# Patient Record
Sex: MALE | Race: WHITE | NOT HISPANIC OR LATINO | Employment: OTHER | URBAN - NONMETROPOLITAN AREA
[De-identification: names, ages, dates, MRNs, and addresses within clinical notes are randomized per-mention and may not be internally consistent; named-entity substitution may affect disease eponyms.]

---

## 2022-08-16 ENCOUNTER — OFFICE VISIT (OUTPATIENT)
Dept: CARDIOLOGY | Facility: CLINIC | Age: 87
End: 2022-08-16

## 2022-08-16 VITALS
HEIGHT: 71 IN | BODY MASS INDEX: 30.8 KG/M2 | HEART RATE: 66 BPM | WEIGHT: 220 LBS | SYSTOLIC BLOOD PRESSURE: 150 MMHG | DIASTOLIC BLOOD PRESSURE: 70 MMHG

## 2022-08-16 DIAGNOSIS — I25.9 IHD (ISCHEMIC HEART DISEASE): Primary | ICD-10-CM

## 2022-08-16 DIAGNOSIS — E88.81 METABOLIC SYNDROME: ICD-10-CM

## 2022-08-16 DIAGNOSIS — R06.02 SHORTNESS OF BREATH: ICD-10-CM

## 2022-08-16 DIAGNOSIS — R07.89 CHEST PRESSURE: ICD-10-CM

## 2022-08-16 DIAGNOSIS — I48.20 CHRONIC ATRIAL FIBRILLATION: ICD-10-CM

## 2022-08-16 DIAGNOSIS — T14.8XXA BRUISE: ICD-10-CM

## 2022-08-16 PROCEDURE — 99204 OFFICE O/P NEW MOD 45 MIN: CPT | Performed by: INTERNAL MEDICINE

## 2022-08-16 PROCEDURE — 93000 ELECTROCARDIOGRAM COMPLETE: CPT | Performed by: INTERNAL MEDICINE

## 2022-08-16 RX ORDER — OMEPRAZOLE 40 MG/1
40 CAPSULE, DELAYED RELEASE ORAL DAILY
COMMUNITY
End: 2022-11-15 | Stop reason: ALTCHOICE

## 2022-08-16 RX ORDER — ISOSORBIDE MONONITRATE 30 MG/1
30 TABLET, EXTENDED RELEASE ORAL 2 TIMES DAILY
COMMUNITY

## 2022-08-16 RX ORDER — SIMVASTATIN 20 MG
20 TABLET ORAL NIGHTLY
COMMUNITY

## 2022-08-16 RX ORDER — ALPRAZOLAM 0.5 MG/1
0.5 TABLET ORAL 2 TIMES DAILY PRN
COMMUNITY

## 2022-08-16 RX ORDER — METOPROLOL SUCCINATE 25 MG/1
TABLET, EXTENDED RELEASE ORAL
COMMUNITY
End: 2023-02-22 | Stop reason: SDUPTHER

## 2022-08-16 RX ORDER — SUCRALFATE 1 G/1
1 TABLET ORAL 2 TIMES DAILY
COMMUNITY
End: 2022-11-15 | Stop reason: ALTCHOICE

## 2022-08-16 RX ORDER — CLOPIDOGREL BISULFATE 75 MG/1
75 TABLET ORAL DAILY
COMMUNITY

## 2022-08-16 RX ORDER — ASPIRIN 325 MG
325 TABLET, DELAYED RELEASE (ENTERIC COATED) ORAL DAILY
COMMUNITY
End: 2022-08-16

## 2022-08-16 NOTE — PROGRESS NOTES
"Chief Complaint   Patient presents with   • Establish Care     PCP referred, chest pain with history of MI   • Chest Pain     Started a couple weeks ago. Discomfort  in chest \" like I needed to burp but couldn't\" Nausea noted. Went to ER at [facility] a week ago Sunday.  Had several episodes over 8 days, after taking prilosec for a week and PCP increased his Imdur the day after he went to ER last Sunday,  symptoms have resolved.    • Cardiac history     Reports having an MI with stenting at [place] 20+ years ago. MI 5 months after the first MI, said cath was normal. Followed with [facility] with regular stress and echo. Last seen about 10 years ago. No testing since and has had no problems until recently.   • Shortness of Breath     With increased exertion.    • Labs     Most recent labs from PCP and ER visit in chart.         CARDIAC COMPLAINTS  chest pressure/discomfort and dyspnea      Subjective   Balbir Gillespie is a 87 y.o. male came in today for his initial cardiac evaluation.  He has history of ischemic heart disease diagnosed more than 20 years ago.  He apparently had 2 MI about 5 months apart and after the second MI he had stent placed.  He has been followed by a cardiologist from [place].  The last time he has seen them was almost 10 years ago.  He has been completely asymptomatic till recently.  But few weeks ago he started having discomfort in his chest in the form of pressure-like feeling where he feels like he could burp and feel better.  But he was not able to burping.  He also started noticing nausea.  About a week ago he was taken to the emergency room because of the symptoms.  His EKG done in the ER which I reviewed showed that he was in atrial fibrillation with occasional PVC.  His lab work showed normal renal function.  Borderline elevated troponin.  He was sent home at that time.  He then had a CT scan of the abdomen which shows evidence of diverticulosis.  Later when he was seen at your " office Prilosec was started after which the symptoms did get better.  He is now scheduled to undergo upper GI.  He is now referred for further evaluation of his cardiac status.  He also complain of having shortness of breath which occurs mostly on exertion.  It occurs mostly when he walk uphill.  He has been bowling regularly and wants to know whether he can go back bowling since he is not having any symptoms at this time.  He denies having any dizziness, palpitation or loss of consciousness.  His cholesterol checked recently was 110 with the LDL of 58.  His liver enzymes were normal.  He used to smoke in the past but quit in .  His mother  of a heart failure and so did his father.    Past Surgical History:   Procedure Laterality Date   • CARDIOVASCULAR STRESS TEST  2011    @ [email or web address]. Lexiscan- EF 44%. Anteroapical Infarct       Current Outpatient Medications   Medication Sig Dispense Refill   • ALPRAZolam (XANAX) 0.5 MG tablet Take 0.5 mg by mouth 2 (Two) Times a Day As Needed for Anxiety.     • clopidogrel (PLAVIX) 75 MG tablet Take 75 mg by mouth Daily.     • isosorbide mononitrate (IMDUR) 30 MG 24 hr tablet Take 30 mg by mouth 2 (Two) Times a Day.     • metoprolol succinate XL (TOPROL-XL) 25 MG 24 hr tablet 1/2 tab once daily     • omeprazole (priLOSEC) 40 MG capsule Take 40 mg by mouth Daily.     • simvastatin (ZOCOR) 20 MG tablet Take 20 mg by mouth Every Night.     • sucralfate (CARAFATE) 1 g tablet Take 1 g by mouth 2 (Two) Times a Day.     • rivaroxaban (XARELTO) 15 MG tablet Take 1 tablet by mouth Daily. 30 tablet 6     No current facility-administered medications for this visit.           ALLERGIES:  Lisinopril    Past Medical History:   Diagnosis Date   • Anxiety    • Coronary artery disease    • History of abdominal surgery     to remove a benign tumor   • History of cholecystectomy    • History of hernia repair    • History of tonsillectomy    • Hyperlipidemia    • Hypertension    •  "Myocardial infarction (HCC)        Social History     Tobacco Use   Smoking Status Former Smoker   • Packs/day: 1.50   • Years: 20.00   • Pack years: 30.00   • Quit date:    • Years since quittin.6   Smokeless Tobacco Never Used          Family History   Problem Relation Age of Onset   • Other Mother         PPM   • Heart failure Mother    • Heart failure Father    • Heart attack Father    • Parkinsonism Brother    • Cancer Maternal Grandmother    • Alcohol abuse Maternal Grandfather    • Other Paternal Grandmother          from natural causes   • Other Paternal Grandfather    • No Known Problems Son    • No Known Problems Son        Review of Systems   Constitutional: Negative for decreased appetite and malaise/fatigue.   HENT: Negative for congestion and sore throat.    Eyes: Negative for blurred vision.   Cardiovascular: Positive for chest pain and dyspnea on exertion.   Respiratory: Positive for shortness of breath. Negative for snoring.    Endocrine: Negative for cold intolerance and heat intolerance.   Hematologic/Lymphatic: Negative for adenopathy. Does not bruise/bleed easily.   Skin: Negative for itching, nail changes and skin cancer.   Musculoskeletal: Negative for arthritis and myalgias.   Gastrointestinal: Negative for abdominal pain, dysphagia and heartburn.   Genitourinary: Negative for bladder incontinence and frequency.   Neurological: Negative for dizziness, light-headedness, seizures and vertigo.   Psychiatric/Behavioral: Negative for altered mental status.   Allergic/Immunologic: Negative for environmental allergies and hives.       Diabetes- No  Thyroid- normal    Objective     /70 (BP Location: Left arm)   Pulse 66   Ht 180.3 cm (71\")   Wt 99.8 kg (220 lb)   BMI 30.68 kg/m²     Vitals and nursing note reviewed.   Constitutional:       Appearance: Healthy appearance. Not in distress.   Eyes:      Conjunctiva/sclera: Conjunctivae normal.      Pupils: Pupils are equal, round, " and reactive to light.   HENT:      Head: Normocephalic.   Pulmonary:      Effort: Pulmonary effort is normal.      Breath sounds: Normal breath sounds.   Cardiovascular:      PMI at left midclavicular line. Normal rate. Irregular rhythm.      Murmurs: There is a grade 3/6 high frequency blowing holosystolic murmur at the apex.   Abdominal:      General: Bowel sounds are normal.      Palpations: Abdomen is soft.   Musculoskeletal: Normal range of motion.      Cervical back: Normal range of motion and neck supple. Skin:     General: Skin is warm and dry.   Neurological:      Mental Status: Alert, oriented to person, place, and time and oriented to person, place and time.           ECG 12 Lead    Date/Time: 8/16/2022 11:17 AM  Performed by: Marlyn Mak MD  Authorized by: Marlyn Mak MD   Comparison: compared with previous ECG from 8/7/2022  Similar to previous ECG  Rhythm: atrial fibrillation  Rate: normal  Conduction: left anterior fascicular block  Q waves: V1, V2, V3 and V4    QRS axis: left    Clinical impression: abnormal EKG              @ASSESSMENT/PLAN@  BMI is >= 30 and <35. (Class 1 Obesity). The following options were offered after discussion;: weight loss educational material (shared in after visit summary), exercise counseling/recommendations and nutrition counseling/recommendations     Diagnoses and all orders for this visit:    1. IHD (ischemic heart disease) (Primary)  -     Stress Test With Myocardial Perfusion One Day; Future  -     Adult Transthoracic Echo Complete W/ Cont if Necessary Per Protocol; Future    2. Chest pressure  -     Stress Test With Myocardial Perfusion One Day; Future    3. Chronic atrial fibrillation (HCC)  -     rivaroxaban (XARELTO) 15 MG tablet; Take 1 tablet by mouth Daily.  Dispense: 30 tablet; Refill: 6    4. Metabolic syndrome    5. Shortness of breath  -     Adult Transthoracic Echo Complete W/ Cont if Necessary Per Protocol; Future    6. Bruise    At  baseline his heart rate is stable.  His blood pressure is mildly elevated.  His EKG shows atrial fibrillation with controlled ventricular response.  He does have Q waves in the anterior leads.  His clinical examination reveals a BMI of 31.  He does have 3/6 systolic murmur at the mitral area.    Regarding his ischemic heart disease, I reviewed the stress test which was done in 2011.  It showed the left ventricular function was around 44% and evidence of apical infarct noted.  At this time, he needs to undergo repeat stress test to rule out any ischemic changes.  I scheduled as a Lexiscan.    Regarding his chest pressure, it does appears to be GI but given his risk factors need to rule out cardiac.  We will review the stress test    Regarding the atrial fibrillation, it seems to be that he has chronic atrial fibrillation with controlled ventricular response.  Since the rate is well controlled, he is not having any symptoms of palpitation.  I did talk to him about the increased risk of developing stroke.  I like to start him on Xarelto.  He was worried about the bruising he has.  When I reviewed his medication, he is on Plavix but on a full dose of aspirin.  I explained to him that 81 mg aspirin should be enough for him.  Since we are starting the Xarelto he can completely stop the aspirin and stay on Plavix alone.    Regarding metabolic syndrome, talked to him about diet and gave him papers for Mediterranean diet which might help for his GI also    Regarding the shortness of breath, it could be secondary to A. fib or it could be from LV dysfunction.  I like to repeat the echocardiogram to reevaluate his LV function, valvular structures, left atrial size and the PA pressure.  If the left ventricular function appears to be less than 45%, need to consider adding ACE inhibitor's or Cozaar to help with the symptoms.    Regarding the bruising, I think cutting down the aspirin should be able to reduce the amount of  bruising.    Regarding advanced directive, he does not have a living will or power of .  I talked to him about it and gave him booklets regarding that    Based on the results, further recommendations will be made               Electronically signed by Marlyn Mak MD August 16, 2022 11:08 EDT

## 2022-08-29 ENCOUNTER — TELEPHONE (OUTPATIENT)
Dept: CARDIOLOGY | Facility: CLINIC | Age: 87
End: 2022-08-29

## 2022-09-02 NOTE — TELEPHONE ENCOUNTER
"Pt called, after talking with PCP he has decided to hold his blood thinners until his testing is done in about 3 weeks \" to let my body recover\". He said he completely understands the risks of going without the blood thinner and is willing to take that risk.  He said that after his testing he would like to start the Eliquis 2.5 mg BID and ASA 81 mg daily but would like to hold off on sending script until after the testing.   "

## 2022-09-27 ENCOUNTER — HOSPITAL ENCOUNTER (OUTPATIENT)
Dept: CARDIOLOGY | Facility: HOSPITAL | Age: 87
Discharge: HOME | End: 2022-09-27

## 2022-09-27 DIAGNOSIS — I25.9 IHD (ISCHEMIC HEART DISEASE): ICD-10-CM

## 2022-09-27 DIAGNOSIS — R06.02 SHORTNESS OF BREATH: ICD-10-CM

## 2022-09-27 DIAGNOSIS — R07.89 CHEST PRESSURE: ICD-10-CM

## 2022-09-27 LAB
BH CV ECHO MEAS - ACS: 2.26 CM
BH CV ECHO MEAS - AO MAX PG: 3.9 MMHG
BH CV ECHO MEAS - AO MEAN PG: 2.3 MMHG
BH CV ECHO MEAS - AO ROOT DIAM: 3.4 CM
BH CV ECHO MEAS - AO V2 MAX: 99.1 CM/SEC
BH CV ECHO MEAS - AO V2 VTI: 21.7 CM
BH CV ECHO MEAS - EDV(CUBED): 147.2 ML
BH CV ECHO MEAS - EDV(MOD-SP4): 103 ML
BH CV ECHO MEAS - EF(MOD-SP4): 25.8 %
BH CV ECHO MEAS - ESV(CUBED): 105.2 ML
BH CV ECHO MEAS - ESV(MOD-SP4): 76.4 ML
BH CV ECHO MEAS - FS: 10.6 %
BH CV ECHO MEAS - IVS/LVPW: 1.08 CM
BH CV ECHO MEAS - IVSD: 1.57 CM
BH CV ECHO MEAS - LA DIMENSION: 5.2 CM
BH CV ECHO MEAS - LAT PEAK E' VEL: 10 CM/SEC
BH CV ECHO MEAS - LV DIASTOLIC VOL/BSA (35-75): 46.9 CM2
BH CV ECHO MEAS - LV MASS(C)D: 353.9 GRAMS
BH CV ECHO MEAS - LV SYSTOLIC VOL/BSA (12-30): 34.8 CM2
BH CV ECHO MEAS - LVIDD: 5.3 CM
BH CV ECHO MEAS - LVIDS: 4.7 CM
BH CV ECHO MEAS - LVOT AREA: 3.7 CM2
BH CV ECHO MEAS - LVOT DIAM: 2.18 CM
BH CV ECHO MEAS - LVPWD: 1.45 CM
BH CV ECHO MEAS - MED PEAK E' VEL: 5.1 CM/SEC
BH CV ECHO MEAS - MV DEC SLOPE: 780 CM/SEC2
BH CV ECHO MEAS - MV E MAX VEL: 108 CM/SEC
BH CV ECHO MEAS - RAP SYSTOLE: 10 MMHG
BH CV ECHO MEAS - RVDD: 3.5 CM
BH CV ECHO MEAS - RVSP: 67.4 MMHG
BH CV ECHO MEAS - SI(MOD-SP4): 12.1 ML/M2
BH CV ECHO MEAS - SV(MOD-SP4): 26.6 ML
BH CV ECHO MEAS - TR MAX PG: 57.4 MMHG
BH CV ECHO MEAS - TR MAX VEL: 378.7 CM/SEC
BH CV ECHO MEASUREMENTS AVERAGE E/E' RATIO: 14.3
BH CV REST NUCLEAR ISOTOPE DOSE: 10 MCI
BH CV STRESS COMMENTS STAGE 1: NORMAL
BH CV STRESS DOSE REGADENOSON STAGE 1: 0.4
BH CV STRESS DURATION MIN STAGE 1: 0
BH CV STRESS DURATION SEC STAGE 1: 10
BH CV STRESS NUCLEAR ISOTOPE DOSE: 30 MCI
BH CV STRESS PROTOCOL 1: NORMAL
BH CV STRESS RECOVERY BP: NORMAL MMHG
BH CV STRESS RECOVERY HR: 65 BPM
BH CV STRESS STAGE 1: 1
LEFT ATRIUM VOLUME INDEX: 45.9 ML/M2
LV EF NUC BP: 20 %
MAXIMAL PREDICTED HEART RATE: 133 BPM
MAXIMAL PREDICTED HEART RATE: 133 BPM
PERCENT MAX PREDICTED HR: 48.87 %
STRESS BASELINE BP: NORMAL MMHG
STRESS BASELINE HR: 62 BPM
STRESS PERCENT HR: 57 %
STRESS POST PEAK BP: NORMAL MMHG
STRESS POST PEAK HR: 65 BPM
STRESS TARGET HR: 113 BPM
STRESS TARGET HR: 113 BPM

## 2022-09-27 PROCEDURE — 25010000002 REGADENOSON 0.4 MG/5ML SOLUTION: Performed by: INTERNAL MEDICINE

## 2022-09-27 PROCEDURE — 0 TECHNETIUM SESTAMIBI: Performed by: INTERNAL MEDICINE

## 2022-09-27 PROCEDURE — 93018 CV STRESS TEST I&R ONLY: CPT | Performed by: INTERNAL MEDICINE

## 2022-09-27 PROCEDURE — 78452 HT MUSCLE IMAGE SPECT MULT: CPT

## 2022-09-27 PROCEDURE — 78452 HT MUSCLE IMAGE SPECT MULT: CPT | Performed by: INTERNAL MEDICINE

## 2022-09-27 PROCEDURE — 93017 CV STRESS TEST TRACING ONLY: CPT

## 2022-09-27 PROCEDURE — 93306 TTE W/DOPPLER COMPLETE: CPT | Performed by: INTERNAL MEDICINE

## 2022-09-27 PROCEDURE — A9500 TC99M SESTAMIBI: HCPCS | Performed by: INTERNAL MEDICINE

## 2022-09-27 PROCEDURE — 93306 TTE W/DOPPLER COMPLETE: CPT

## 2022-09-27 RX ADMIN — TECHNETIUM TC 99M SESTAMIBI 1 DOSE: 1 INJECTION INTRAVENOUS at 08:18

## 2022-09-27 RX ADMIN — REGADENOSON 0.4 MG: 0.08 INJECTION, SOLUTION INTRAVENOUS at 10:08

## 2022-09-27 RX ADMIN — TECHNETIUM TC 99M SESTAMIBI 1 DOSE: 1 INJECTION INTRAVENOUS at 10:08

## 2022-09-28 NOTE — PROGRESS NOTES
Spoke at length with Carlos, she is  aware of stress and echo results and the following recommendations:    Start Entresto 24/26 twice a day and gradually increase  Patient need LifeVest  Patient need elective cardiac catheterization  Need to consider ICD placement if no improvement after 90 days    She is going to discuss results with patient and call me and let me know whether or not  he agrees to proceed with recommendations. He also had not wanted to start the Eliquis until the results from stress and echo. Advised her that he does need to start the Eliquis 2.5 mg BID and ASA 81 mg daily as we had discussed in telephone encounter from 8/29/22.

## 2022-09-29 DIAGNOSIS — I51.9 SYSTOLIC DYSFUNCTION: ICD-10-CM

## 2022-09-29 DIAGNOSIS — R94.39 ABNORMAL NUCLEAR STRESS TEST: ICD-10-CM

## 2022-09-29 DIAGNOSIS — I25.9 IHD (ISCHEMIC HEART DISEASE): Primary | ICD-10-CM

## 2022-09-29 DIAGNOSIS — I42.0 DCM (DILATED CARDIOMYOPATHY): Primary | ICD-10-CM

## 2022-09-29 RX ORDER — SACUBITRIL AND VALSARTAN 24; 26 MG/1; MG/1
1 TABLET, FILM COATED ORAL 2 TIMES DAILY
Qty: 180 TABLET | Refills: 3 | Status: CANCELLED | OUTPATIENT
Start: 2022-09-29

## 2022-09-29 NOTE — TELEPHONE ENCOUNTER
Spoke at length with Carlos, she is  aware of stress and echo results and the following recommendations:     Start Entresto 24/26 twice a day and gradually increase  Patient need LifeVest  Patient need elective cardiac catheterization  Need to consider ICD placement if no improvement after 90 days     She is going to discuss results with patient and call me and let me know whether or not  he agrees to proceed with recommendations. He also had not wanted to start the Eliquis until the results from stress and echo. Advised her that he does need to start the Eliquis 2.5 mg BID and ASA 81 mg daily as we had discussed in telephone encounter from 8/29/22.     Orders in and sent for signature. Charlette at [facility] aware of impending order for Lifevest.

## 2022-09-29 NOTE — TELEPHONE ENCOUNTER
Carlos called back. [employer] rep came to fit for Life vest. After long discussion patient decided that he would take the risk rather than wear the life vest. States he is well aware of risk. Dr Mak aware. Pt is also going to  samples of the Entresto 24/26 mg BID, due to pt's allergy of Lisinopril Dr Mak said to advise him to not have pt start it until Monday 10/3 prior to the cath on Tuesday. Carlos is aware and is going to advise pt.

## 2022-09-29 NOTE — TELEPHONE ENCOUNTER
"Spoke w/ Carlos.  Made aware of provider recommendations.  Pt agreeable to start medications.  Sent to pharmacy.  Offered samples to try if he wanted them. Verbalizes, \"He will just  from the pharmacy and not get the samples.\"    "

## 2022-09-29 NOTE — TELEPHONE ENCOUNTER
"Allergy came up to Lisinopril with Entresto was added. I spoke to patient. He took lisinopril for 10 years then developed an allergy \" I think my tongue swelled up\". Has been about 10 years since that happened. He is willing to try it if you think it could benefit him.   "

## 2022-10-04 ENCOUNTER — OUTSIDE FACILITY SERVICE (OUTPATIENT)
Dept: CARDIOLOGY | Facility: CLINIC | Age: 87
End: 2022-10-04

## 2022-10-04 PROCEDURE — 93458 L HRT ARTERY/VENTRICLE ANGIO: CPT | Performed by: INTERNAL MEDICINE

## 2022-10-11 ENCOUNTER — TELEPHONE (OUTPATIENT)
Dept: CARDIOLOGY | Facility: CLINIC | Age: 87
End: 2022-10-11

## 2022-10-17 ENCOUNTER — OUTSIDE FACILITY SERVICE (OUTPATIENT)
Dept: CARDIOLOGY | Facility: CLINIC | Age: 87
End: 2022-10-17

## 2022-10-17 PROCEDURE — 99215 OFFICE O/P EST HI 40 MIN: CPT | Performed by: INTERNAL MEDICINE

## 2022-10-18 ENCOUNTER — OUTSIDE FACILITY SERVICE (OUTPATIENT)
Dept: CARDIOLOGY | Facility: CLINIC | Age: 87
End: 2022-10-18

## 2022-10-18 PROCEDURE — 93306 TTE W/DOPPLER COMPLETE: CPT | Performed by: INTERNAL MEDICINE

## 2022-10-18 PROCEDURE — 99214 OFFICE O/P EST MOD 30 MIN: CPT | Performed by: INTERNAL MEDICINE

## 2022-11-15 ENCOUNTER — OFFICE VISIT (OUTPATIENT)
Dept: CARDIOLOGY | Facility: CLINIC | Age: 87
End: 2022-11-15

## 2022-11-15 VITALS
HEIGHT: 71 IN | WEIGHT: 209.4 LBS | BODY MASS INDEX: 29.31 KG/M2 | HEART RATE: 74 BPM | SYSTOLIC BLOOD PRESSURE: 140 MMHG | DIASTOLIC BLOOD PRESSURE: 70 MMHG

## 2022-11-15 DIAGNOSIS — Z79.01 CURRENT USE OF LONG TERM ANTICOAGULATION: ICD-10-CM

## 2022-11-15 DIAGNOSIS — I48.20 CHRONIC ATRIAL FIBRILLATION: ICD-10-CM

## 2022-11-15 DIAGNOSIS — I51.9 SYSTOLIC DYSFUNCTION: ICD-10-CM

## 2022-11-15 DIAGNOSIS — I50.21 ACUTE HFREF (HEART FAILURE WITH REDUCED EJECTION FRACTION): ICD-10-CM

## 2022-11-15 DIAGNOSIS — I25.9 IHD (ISCHEMIC HEART DISEASE): Primary | ICD-10-CM

## 2022-11-15 PROCEDURE — 93000 ELECTROCARDIOGRAM COMPLETE: CPT | Performed by: NURSE PRACTITIONER

## 2022-11-15 PROCEDURE — 99214 OFFICE O/P EST MOD 30 MIN: CPT | Performed by: NURSE PRACTITIONER

## 2022-11-15 RX ORDER — FAMOTIDINE 40 MG/1
40 TABLET, FILM COATED ORAL DAILY
COMMUNITY
End: 2023-02-22

## 2022-11-15 NOTE — PROGRESS NOTES
Chief Complaint   Patient presents with   • Hospital Follow Up Visit     Post Cardiac  Cath . Patient  said he  feels stronger after cath and starting Entresto.  Reports all symptoms have improved .   • LABS     Had labs August 2022  , results on chart. No current labs per PCP   • Med Refill     Needs refills on Entresto and Eliquis  to 90 day supply to [facility]       Subjective       Balbir Gillespie is a 87 y.o. male seen in August 2022 to establish cardiac care.  He has history of IHD.  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  He followed in [place] for a period of time. At consultation he admitted to chest pressure and nausea.  He had gone to emergency department for similar symptoms and EKG revealed atrial fibrillation.  Borderline elevated troponin was noted.  CT scan of abdomen showed evidence of diverticulosis.  Prilosec was added.  Repeat EKG showed persistent atrial fibrillation.  Xarelto was prescribed. On 9/27/2022 Lexiscan stress test showed moderate to large area of anterior and apical infarct.  EF was 20%.  Entresto was prescribed along with LifeVest.  Echocardiogram showed EF of 21-25% and mild to moderate TR with RVSP of 67 mmHg. On 10/4/2022 cardiac catheterization revealed significant disease of LAD.  2.75 x 8 mm resolute stent was placed.  He was continued on Entresto and beta-blockers along with a LifeVest.  If no improvement after 90 days then ICD will be advised. On 10/18/2022 echocardiogram showed EF around 30% with moderate MR and RVSP around 46 mmHg.    Today he returns to the office for a follow-up visit.  He admits to feeling much better and denies recent chest pain, shortness of breath, or edema.  He has resumed bowling most days of the week.  Due to history of bleeding with anticoagulation therapy in the past he agreed to low-dose Eliquis therapy.  He remains on Plavix.  Recent bleeding denied.    Cardiac History:    Past  Surgical History:   Procedure Laterality Date   • CARDIOVASCULAR STRESS TEST  2011    @ [email or web address]. Lexiscan- EF 44%. Anteroapical Infarct   • CARDIOVASCULAR STRESS TEST  2022    Lexiscan-170/86. EF 20%. Large anterior and apical  Infarct   • ECHO - CONVERTED  2022    EF < 25%. Anterior WMA. LA- 5.2. Trace-Mild MR & AI. RVSP- 67 mmHg       Current Outpatient Medications   Medication Sig Dispense Refill   • ALPRAZolam (XANAX) 0.5 MG tablet Take 0.5 mg by mouth 2 (Two) Times a Day As Needed for Anxiety.     • apixaban (ELIQUIS) 2.5 MG tablet tablet Take 1 tablet by mouth 2 (Two) Times a Day. 180 tablet 2   • clopidogrel (PLAVIX) 75 MG tablet Take 75 mg by mouth Daily.     • famotidine (PEPCID) 40 MG tablet Take 40 mg by mouth Daily.     • isosorbide mononitrate (IMDUR) 30 MG 24 hr tablet Take 30 mg by mouth 2 (Two) Times a Day.     • metoprolol succinate XL (TOPROL-XL) 25 MG 24 hr tablet 1/2 tab once daily     • sacubitril-valsartan (ENTRESTO) 24-26 MG tablet Take 1 tablet by mouth 2 (Two) Times a Day. 180 tablet 2   • simvastatin (ZOCOR) 20 MG tablet Take 20 mg by mouth Every Night.       No current facility-administered medications for this visit.       Lisinopril    Past Medical History:   Diagnosis Date   • Anxiety    • Coronary artery disease    • History of abdominal surgery     to remove a benign tumor   • History of cholecystectomy    • History of hernia repair    • History of tonsillectomy    • Hyperlipidemia    • Hypertension    • Myocardial infarction (HCC)        Social History     Socioeconomic History   • Marital status:    Tobacco Use   • Smoking status: Former     Packs/day: 1.50     Years: 20.00     Pack years: 30.00     Types: Cigarettes     Quit date:      Years since quittin.9   • Smokeless tobacco: Never   Vaping Use   • Vaping Use: Never used   Substance and Sexual Activity   • Alcohol use: Never   • Drug use: Never       Family History   Problem Relation Age of  "Onset   • Other Mother         PPM   • Heart failure Mother    • Heart failure Father    • Heart attack Father    • Parkinsonism Brother    • Cancer Maternal Grandmother    • Alcohol abuse Maternal Grandfather    • Other Paternal Grandmother          from natural causes   • Other Paternal Grandfather    • No Known Problems Son    • No Known Problems Son        Review of Systems   Constitutional: Positive for malaise/fatigue (improved). Negative for decreased appetite and diaphoresis.   HENT: Negative for nosebleeds.    Eyes: Negative for blurred vision.   Cardiovascular: Positive for irregular heartbeat. Negative for chest pain, claudication, cyanosis, dyspnea on exertion, leg swelling, near-syncope, orthopnea, palpitations, paroxysmal nocturnal dyspnea and syncope.   Respiratory: Negative for shortness of breath.    Endocrine: Negative for cold intolerance and heat intolerance.   Hematologic/Lymphatic: Negative for bleeding problem. Bruises/bleeds easily.   Skin: Negative for rash.   Musculoskeletal: Negative for myalgias.   Gastrointestinal: Negative for heartburn, melena and nausea.   Genitourinary: Negative for dysuria and hematuria.   Neurological: Negative for dizziness and light-headedness.   Psychiatric/Behavioral: The patient does not have insomnia and is not nervous/anxious.         BP Readings from Last 5 Encounters:   11/15/22 140/70   22 150/70       Wt Readings from Last 5 Encounters:   11/15/22 95 kg (209 lb 6.4 oz)   22 99.8 kg (220 lb)       Objective     /70 (BP Location: Left arm, Patient Position: Sitting)   Pulse 74   Ht 180.3 cm (71\")   Wt 95 kg (209 lb 6.4 oz)   BMI 29.21 kg/m²     Vitals and nursing note reviewed.   Constitutional:       Appearance: Healthy appearance. Not in distress.   Eyes:      Conjunctiva/sclera: Conjunctivae normal.      Pupils: Pupils are equal, round, and reactive to light.   HENT:      Head: Normocephalic.   Neck:      Vascular: No carotid " bruit or JVD.   Pulmonary:      Effort: Pulmonary effort is normal.      Breath sounds: Normal breath sounds.   Cardiovascular:      PMI at left midclavicular line. Normal rate. Irregular rhythm.      Murmurs: There is no murmur.   Pulses:     Intact distal pulses.   Edema:     Peripheral edema absent.   Abdominal:      General: Bowel sounds are normal.      Palpations: Abdomen is soft.   Musculoskeletal: Normal range of motion.      Cervical back: Normal range of motion and neck supple. Skin:     General: Skin is warm and dry.   Neurological:      Mental Status: Alert, oriented to person, place, and time and oriented to person, place and time.            ECG 12 Lead    Date/Time: 11/15/2022 4:10 PM  Performed by: Layla Grissom APRN  Authorized by: Layla Grissom APRN   Comparison: compared with previous ECG from 8/16/2022  Similar to previous ECG  Rhythm: atrial fibrillation  BPM: 74                   Assessment & Plan   Diagnoses and all orders for this visit:    1. IHD (ischemic heart disease) (Primary)  -     ECG 12 Lead    2. Systolic dysfunction  -     Adult Transthoracic Echo Limited W/ Cont if Necessary Per Protocol; Future  -     sacubitril-valsartan (ENTRESTO) 24-26 MG tablet; Take 1 tablet by mouth 2 (Two) Times a Day.  Dispense: 180 tablet; Refill: 2    3. Acute HFrEF (heart failure with reduced ejection fraction) (HCC)  -     Adult Transthoracic Echo Limited W/ Cont if Necessary Per Protocol; Future  -     ECG 12 Lead    4. Chronic atrial fibrillation (HCC)  -     apixaban (ELIQUIS) 2.5 MG tablet tablet; Take 1 tablet by mouth 2 (Two) Times a Day.  Dispense: 180 tablet; Refill: 2    5. Current use of long term anticoagulation      IHD  - Recent cardiac catheterization with stenting of LAD.   -Currently anginal symptoms denied.  -He admits to taking Plavix.  Consider adding low-dose daily aspirin  -Continue statin therapy    Systolic heart failure  - Recent cath showed EF around 30%.  We will plan  repeat limited echocardiogram to relook at cardiac output 90 days post stent placement and medication management.    Chronic atrial fibrillation  -EKG shows atrial fibrillation with controlled ventricular rates.  Continue beta-blocker in form of Toprol-XL 25 mg daily.  -For stroke prevention he is on Eliquis therapy but agrees to only renal dose due to history of bleeding in the past.    Further recommendations once repeat echocardiogram results available.  A 3-month follow-up visit scheduled.

## 2022-12-19 ENCOUNTER — TELEPHONE (OUTPATIENT)
Dept: CARDIOLOGY | Facility: CLINIC | Age: 87
End: 2022-12-19

## 2022-12-19 NOTE — TELEPHONE ENCOUNTER
Caller: MERNA CHÁVEZ    Relationship: Emergency Contact    Best call back number:     What is the best time to reach you: ANY    Who are you requesting to speak with (clinical staff, provider,  specific staff member): CLINICAL     Do you know the name of the person who called:     What was the call regarding: PT HAS A SEVERE NOSEBLEED AND IT WONT STOP. CAN PREDNISONE INTERFERE WITH BLOODTHINNERS? HOSPITAL RECENTLY PUT PT ON PREDNISONE. PLEASE CALL BACK TO ANSWER QUESTIONS AND DISCUSS SYMPTOM.     Do you require a callback: YES

## 2022-12-20 NOTE — TELEPHONE ENCOUNTER
"Spoke with pt, he states he is now holding his blood thinner.  I advised the pt that he does not need to hold his blood thinner.  He replied \"he can go to hel, I'm not taking it.\" and hung up the phone.  "

## 2023-01-04 ENCOUNTER — HOSPITAL ENCOUNTER (OUTPATIENT)
Dept: CARDIOLOGY | Facility: HOSPITAL | Age: 88
Discharge: HOME | End: 2023-01-04
Admitting: NURSE PRACTITIONER
Payer: COMMERCIAL

## 2023-01-04 DIAGNOSIS — I50.21 ACUTE HFREF (HEART FAILURE WITH REDUCED EJECTION FRACTION): ICD-10-CM

## 2023-01-04 DIAGNOSIS — I51.9 SYSTOLIC DYSFUNCTION: ICD-10-CM

## 2023-01-04 LAB
BH CV ECHO LEFT VENTRICLE GLOBAL LONGITUDINAL STRAIN: -6.4 %
BH CV ECHO MEAS - ACS: 2.43 CM
BH CV ECHO MEAS - AO ROOT DIAM: 3.3 CM
BH CV ECHO MEAS - EDV(CUBED): 199.7 ML
BH CV ECHO MEAS - EDV(MOD-SP2): 97.4 ML
BH CV ECHO MEAS - EDV(MOD-SP4): 108 ML
BH CV ECHO MEAS - EF(MOD-BP): 31.9 %
BH CV ECHO MEAS - EF(MOD-SP2): 36 %
BH CV ECHO MEAS - EF(MOD-SP4): 20.4 %
BH CV ECHO MEAS - EF_3D-VOL: 48 %
BH CV ECHO MEAS - ESV(CUBED): 138.3 ML
BH CV ECHO MEAS - ESV(MOD-SP2): 62.3 ML
BH CV ECHO MEAS - ESV(MOD-SP4): 86 ML
BH CV ECHO MEAS - FS: 11.5 %
BH CV ECHO MEAS - IVS/LVPW: 0.93 CM
BH CV ECHO MEAS - IVSD: 1.2 CM
BH CV ECHO MEAS - LA DIMENSION: 5.2 CM
BH CV ECHO MEAS - LV DIASTOLIC VOL/BSA (35-75): 50.3 CM2
BH CV ECHO MEAS - LV MASS(C)D: 315.8 GRAMS
BH CV ECHO MEAS - LV SYSTOLIC VOL/BSA (12-30): 40 CM2
BH CV ECHO MEAS - LVIDD: 5.8 CM
BH CV ECHO MEAS - LVIDS: 5.2 CM
BH CV ECHO MEAS - LVPWD: 1.29 CM
BH CV ECHO MEAS - RVDD: 2.9 CM
BH CV ECHO MEAS - SI(MOD-SP2): 16.3 ML/M2
BH CV ECHO MEAS - SI(MOD-SP4): 10.2 ML/M2
BH CV ECHO MEAS - SV(MOD-SP2): 35.1 ML
BH CV ECHO MEAS - SV(MOD-SP4): 22 ML
BH CV ECHO MEAS RV FREE WALL STRAIN: -16.7 %
LEFT ATRIUM VOLUME INDEX: 43.4 ML/M2
MAXIMAL PREDICTED HEART RATE: 133 BPM
STRESS TARGET HR: 113 BPM

## 2023-01-04 PROCEDURE — 93308 TTE F-UP OR LMTD: CPT

## 2023-01-04 PROCEDURE — 93356 MYOCRD STRAIN IMG SPCKL TRCK: CPT

## 2023-01-04 PROCEDURE — 93356 MYOCRD STRAIN IMG SPCKL TRCK: CPT | Performed by: INTERNAL MEDICINE

## 2023-01-04 PROCEDURE — 93308 TTE F-UP OR LMTD: CPT | Performed by: INTERNAL MEDICINE

## 2023-01-05 DIAGNOSIS — I42.0 DCM (DILATED CARDIOMYOPATHY): ICD-10-CM

## 2023-01-05 DIAGNOSIS — I51.9 SYSTOLIC DYSFUNCTION: Primary | ICD-10-CM

## 2023-01-05 DIAGNOSIS — I48.20 CHRONIC ATRIAL FIBRILLATION: ICD-10-CM

## 2023-01-05 DIAGNOSIS — R04.0 BLEEDING FROM THE NOSE: ICD-10-CM

## 2023-01-05 DIAGNOSIS — I50.21 ACUTE HFREF (HEART FAILURE WITH REDUCED EJECTION FRACTION): ICD-10-CM

## 2023-01-05 DIAGNOSIS — Z79.01 CURRENT USE OF LONG TERM ANTICOAGULATION: ICD-10-CM

## 2023-01-09 ENCOUNTER — TELEPHONE (OUTPATIENT)
Dept: CARDIOLOGY | Facility: CLINIC | Age: 88
End: 2023-01-09
Payer: COMMERCIAL

## 2023-01-09 DIAGNOSIS — I51.9 SYSTOLIC DYSFUNCTION: Primary | ICD-10-CM

## 2023-01-09 DIAGNOSIS — I48.20 CHRONIC ATRIAL FIBRILLATION: ICD-10-CM

## 2023-01-09 DIAGNOSIS — Z79.01 CURRENT USE OF LONG TERM ANTICOAGULATION: ICD-10-CM

## 2023-01-09 DIAGNOSIS — R58 BLEEDING: ICD-10-CM

## 2023-01-09 NOTE — TELEPHONE ENCOUNTER
Patient called stating that he is agreeable to see Dr. Barba for ICD and the Watchman. He is asking if his recent stent needs to be looked at to make sure that it isn't causing a problem? He states that he though with stenting that his EF would have been higher.

## 2023-01-11 NOTE — TELEPHONE ENCOUNTER
Patient's caregiver, Carlos, was made aware that at this time, stress test is not recommended at this time.

## 2023-01-12 ENCOUNTER — TELEPHONE (OUTPATIENT)
Dept: CARDIOLOGY | Facility: CLINIC | Age: 88
End: 2023-01-12
Payer: COMMERCIAL

## 2023-01-12 NOTE — TELEPHONE ENCOUNTER
Caller: MERNA CHÁVEZ    Relationship: Emergency Contact    Best call back number: [phone]    What is the best time to reach you: ANYTIME    What was the call regarding: MERNA CALLED ON BEHALF OG THE PT. PT WAS WONDERING IF IT WAS NECESSARY TO BE SEEN BY CARINA WEI ON 2.21.23 WHEN HE IS BEING SEEN BY DR. TRAN 2.22.23. IT WAS EXPLAINED THAT DR. TRAN IS AN EP AND DIFFERENT THAN A GENERAL CARDIOLOGIST. PT THOUGHT IT WOULD BE BETTER TO BE SEEN AFTER THE APPOINTMENT WITH DR. TRAN BUT WANTED DIRECT ADVICE FROM DR. MATSON BEFORE SWITCHING APPOINTMENT.    Do you require a callback: YES

## 2023-01-16 NOTE — TELEPHONE ENCOUNTER
PATIENT CALLED IN  SO WE RESCHEDULED HIS APPT FOR 03/23/2023 @ 3:00 WITH CARINA BLANCAS. HE IS AWARE OF THIS APPT.

## 2023-01-17 DIAGNOSIS — I51.9 SYSTOLIC DYSFUNCTION: ICD-10-CM

## 2023-01-17 NOTE — TELEPHONE ENCOUNTER
Patient called stating that Entresto 24/26 mg BID was going to cost $318 a month. Samples of Entresto 49/51 mg BID were given and patient was aware to take 1/2 tablet BID.

## 2023-02-20 NOTE — PROGRESS NOTES
Electrophysiology Clinic Consult     Balbir Gillespie  1935  [unfilled]  [unfilled]    02/22/23    DATE OF ADMISSION: (Not on file)  [facility] CARDIOLOGY MAIN [facility]    Collin Ace MD  [address]  / [facility] [id number]  Referring Provider: Layla Grissom APRN     Chief Complaint   Patient presents with   • IHD (ischemic heart disease)   • Irregular Heart Beat   • Fatigue     Problem list:   1. ICM, HFrEF  a. History of CAD, LAD stent   b. Echo 9/27/2022 LVEF 21 to 25%, the following LV wall segments are dyskinetic: Apical anterior, apical septal and apex, LV systolic function grade 1 impaired relaxation, LA cavity moderately dilated, RA cavity mildly dilated, trace to mild AR, trace to mild MR, mild to moderate TR  c. Stress test 9/27/22: EF 20%, large area of anterior and apical infarct noted, cardiac cath recommended  d. Lifevest recommended 10/2022, patient declined to wear  e. Left heart cath 10/4/2022: LAD stent patent however critical disease involving mid LAD after the stent, close to 90% stenosis. LAD stent placed. RCA 20-30% disease. Poor LV function, LVEF 25% (Dr. Mak)  f. Echo 10/18/2022: LVEF 30%, four-chamber dilatation, wall motion abnormality, diastolic dysfunction, moderate MR, mild to moderate pulmonary hypertension  g. Echo 1/4/2023: LVEF 26 to 30%, the following LV wall segments are hypokinetic: Mid anterior and basal anterior left atrial cavity moderately dilated 5.2 cm, right atrial cavity mildly dilated  2. Atrial fibrillation  a. Dx: September 2022 incidentally  b. Chadsvasc: 4, on Eliquis, dose decreased due to nosebleed x2 that last 24 hours and required packing  3. Epistaxis x2 in the last 6 months  4. HTN  5. HLD  6. Surgeries:   a. Appendix  b. cardiac stent  c. Gallbladder  d. Hernia  e. tonsillectomy      History of Present Illness:   Balbir Gillespie is an 87-year-old male with above past medical history referred by   Aubree's office for ischemic cardiomyopathy, LV dysfunction possible need for ICD implantation and watchman. He started seeing Dr. Mak last fall due to elevated enzymes found during a GI workup.  He underwent stress test and echo.  Echo 9/2022 showed LVEF around 25% with significant wall motion abnormality involving the anteroseptal wall.  He underwent Lexiscan stress test and large area of infarct noted.  He was started on Entresto and advised to wear LifeVest which he declined. Heart cath showed significant restenoses involving the mid LAD and noncritical disease of RCA, EF of 25%, he underwent stenting of LAD.  He was started on Plavix and Xarelto.  Repeat echo 1/4/2023 showed little improvement in EF, 26 to 30% despite entresto and BB. He has symptoms of fatigue that has worsened in the last 6 months especially w exercise. Denies SOB, He has a chest pain that he says he feels might be muscle related as he bowls regularly. Does not have a history of CP, even when he had a heart attack 20+ years ago.   Caffeine: 1 soda per day  Alcohol: none  Stimulant: none  Nicotine: none    Allergies   Allergen Reactions   • Lisinopril Swelling        Cannot display prior to admission medications because the patient has not been admitted in this contact.            Current Outpatient Medications:   •  ALPRAZolam (XANAX) 0.5 MG tablet, Take 0.5 mg by mouth 2 (Two) Times a Day As Needed for Anxiety., Disp: , Rfl:   •  apixaban (ELIQUIS) 2.5 MG tablet tablet, Take 1 tablet by mouth 2 (Two) Times a Day., Disp: 180 tablet, Rfl: 2  •  clopidogrel (PLAVIX) 75 MG tablet, Take 75 mg by mouth Daily., Disp: , Rfl:   •  isosorbide mononitrate (IMDUR) 30 MG 24 hr tablet, Take 30 mg by mouth 2 (Two) Times a Day., Disp: , Rfl:   •  metoprolol succinate XL (TOPROL-XL) 25 MG 24 hr tablet, 1/2 tab once daily, Disp: , Rfl:   •  pantoprazole (PROTONIX) 40 MG EC tablet, Take 40 mg by mouth Daily., Disp: , Rfl:   •  sacubitril-valsartan  (ENTRESTO) 24-26 MG tablet, Take 1 tablet by mouth 2 (Two) Times a Day., Disp: 28 tablet, Rfl: 0  •  simvastatin (ZOCOR) 20 MG tablet, Take 20 mg by mouth Every Night., Disp: , Rfl:     Social History     Socioeconomic History   • Marital status:    Tobacco Use   • Smoking status: Former     Packs/day: 1.50     Years: 20.00     Pack years: 30.00     Types: Cigarettes     Quit date:      Years since quittin.1   • Smokeless tobacco: Never   Vaping Use   • Vaping Use: Never used   Substance and Sexual Activity   • Alcohol use: Never   • Drug use: Never       Family History   Problem Relation Age of Onset   • Other Mother         PPM   • Heart failure Mother    • Heart failure Father    • Heart attack Father    • Parkinsonism Brother    • Cancer Maternal Grandmother    • Alcohol abuse Maternal Grandfather    • Other Paternal Grandmother          from natural causes   • Other Paternal Grandfather    • No Known Problems Son    • No Known Problems Son        REVIEW OF SYSTEMS:   CONST:  No weight loss, fever, chills, +weakness +fatigue.   HEENT:  No visual loss, blurred vision, double vision, yellow sclerae.                   No hearing loss, congestion, sore throat.   SKIN:      No rashes, urticaria, ulcers, sores.     RESP:     No shortness of breath, hemoptysis, cough, sputum.   GI:           No anorexia, nausea, vomiting, diarrhea. No abdominal pain, melena.   :         No burning on urination, hematuria or increased frequency.  ENDO:    No diaphoresis, cold or heat intolerance. No polyuria or polydipsia.   NEURO:  No headache, dizziness, syncope, paralysis, ataxia, or parasthesias.                  No change in bowel or bladder control. No history of CVA/TIA  MUSC:    No muscle, back pain, joint pain or stiffness.   HEME:    No anemia, bleeding, bruising. No history of DVT/PE.  PSYCH:  No history of depression, anxiety    Vitals:    23 1250   BP: 148/82   BP Location: Right arm   Patient  "Position: Sitting   Pulse: 61   SpO2: 96%   Weight: 97.5 kg (215 lb)   Height: 180.3 cm (71\")                 Physical Exam:  GEN: Well nourished, well-developed, no acute distress  HEENT: Normocephalic, atraumatic, PERRLA, moist mucous membranes  NECK: Supple, NO JVD, no thyromegaly, no lymphadenopathy  CARD: S1S2, IRR, IRR, S3 present no murmur,  rub, PMI displaced laterally.  LUNGS: Clear to auscultation, normal respiratory effort  ABDOMEN: Soft, nontender, normal bowel sounds  EXTREMITIES: No gross deformities, no clubbing, cyanosis, or edema  SKIN: Warm, dry  NEURO: No focal deficits, alert and oriented x 3  PSYCHIATRIC: Normal affect and mood      I personally viewed and interpreted the patient's EKG/Telemetry/lab data    Lab Results   Component Value Date    CALCIUM 9.0 12/06/2021     12/06/2021    K 4.8 12/06/2021    CO2 27 12/06/2021     12/06/2021    BUN 22 12/06/2021    CREATININE 1.13 12/06/2021    EGFRIFAFRI >60 12/06/2021    EGFRIFNONA >60 12/06/2021    BCR 19 12/06/2021    ANIONGAP 11 12/06/2021     Lab Results   Component Value Date    WBC 9.21 12/06/2021    HGB 14.8 12/06/2021    HCT 45.6 12/06/2021    MCV 97 12/06/2021     12/06/2021     No results found for: INR, PROTIME  No results found for: TSH, D4XDSZM, W5UENRV, THYROIDAB            ECG 12 Lead    Date/Time: 2/22/2023 1:55 PM  Performed by: Yariel Barba MD  Authorized by: Yariel Barba MD   Comparison: compared with previous ECG from 11/15/2022  Similar to previous ECG  Rhythm: atrial fibrillation  Rate: normal  BPM: 61                ICD-10-CM ICD-9-CM   1. Ischemic cardiomyopathy  I25.5 414.8   2. AF (paroxysmal atrial fibrillation) ([facility])  I48.0 427.31   3. CAD, multiple vessel  I25.10 414.00   4. Acute on chronic HFrEF (heart failure with reduced ejection fraction) ([facility])  I50.23 428.23       Assessment and Plan:   1. ICM, HFrEF Class II HF symptoms  -Echo 9/27/2022 LVEF 21 to 25%, Lifevest recommended 10/2022, " patient declined to wear  -Most recent Echo 1/4/2023: LVEF 26 to 30%, the following LV wall segments are hypokinetic: Mid anterior and basal anterior left atrial cavity moderately dilated 5.2 cm, right atrial cavity mildly dilated  -Moderate fatigue worsening over the past few months especially with exertional activities.  -GDMT: increase Entresto to 49-51 BID, Toprol XL 25mg daily  -Repeat limited echo in 4 weeks for EF only. If EF doesn't improve (>35%) with increased dose of medications, will discuss need for ICD.     2. Atrial fibrillation   Chadsvasc: 4, on Eliquis, has had 2 nose bleeds that lasted 24 hours and had to go the ER for packing in the past 6 months since starting Eliquis, dose decreased to 2.5mg BID. Nosebleeds may be worse due to combination of plavix and eliquis. He will likely be able to stop plavix 1 year post stent therefore bleeding may decrease once plavix can be stopped.  Unknown at this time if this is permanent versus persistent versus paroxysmal.-will initiate 1 week ziopatch to evaluate amount of afib he is having and heart rates     3.  Oral anticoagulation Robert Vasc Score 4.  Patient is interested in left atrial pended occlusion with a Watchman device due to his recurrent nosebleeds and current need for both Plavix and Eliquis.  We will rediscussed with him regarding this issue following his limited echocardiogram and decision regarding possible ICD in the near future.      4. CAD  -Left heart cath 10/4/2022: LAD stent patent however critical disease involving mid LAD after the stent, close to 90% stenosis. LAD stent placed. RCA 20-30% disease.   -On plavix   -Following with Dr. Mak    [facility]    Scribed for Yariel Barba MD by MAGALIS Arreola. 2/22/2023  14:20 EST     I, Yariel Barba MD, personally performed the services described in this documentation as scribed by the above named individual in my presence, and it is both accurate and complete.  2/22/2023  14:20  EST

## 2023-02-22 ENCOUNTER — OFFICE VISIT (OUTPATIENT)
Dept: CARDIOLOGY | Facility: CLINIC | Age: 88
End: 2023-02-22
Payer: COMMERCIAL

## 2023-02-22 VITALS
HEART RATE: 61 BPM | DIASTOLIC BLOOD PRESSURE: 82 MMHG | WEIGHT: 215 LBS | BODY MASS INDEX: 30.1 KG/M2 | SYSTOLIC BLOOD PRESSURE: 148 MMHG | OXYGEN SATURATION: 96 % | HEIGHT: 71 IN

## 2023-02-22 DIAGNOSIS — I25.10 CAD, MULTIPLE VESSEL: ICD-10-CM

## 2023-02-22 DIAGNOSIS — I51.9 SYSTOLIC DYSFUNCTION: ICD-10-CM

## 2023-02-22 DIAGNOSIS — I25.5 ISCHEMIC CARDIOMYOPATHY: Primary | ICD-10-CM

## 2023-02-22 DIAGNOSIS — I48.0 AF (PAROXYSMAL ATRIAL FIBRILLATION): ICD-10-CM

## 2023-02-22 DIAGNOSIS — I50.23 ACUTE ON CHRONIC HFREF (HEART FAILURE WITH REDUCED EJECTION FRACTION): ICD-10-CM

## 2023-02-22 PROCEDURE — 93000 ELECTROCARDIOGRAM COMPLETE: CPT | Performed by: INTERNAL MEDICINE

## 2023-02-22 PROCEDURE — 99214 OFFICE O/P EST MOD 30 MIN: CPT | Performed by: INTERNAL MEDICINE

## 2023-02-22 RX ORDER — PANTOPRAZOLE SODIUM 40 MG/1
40 TABLET, DELAYED RELEASE ORAL DAILY
COMMUNITY
End: 2023-04-05

## 2023-02-22 RX ORDER — METOPROLOL SUCCINATE 25 MG/1
25 TABLET, EXTENDED RELEASE ORAL DAILY
Qty: 30 TABLET | Refills: 8 | Status: SHIPPED | OUTPATIENT
Start: 2023-02-22

## 2023-03-22 ENCOUNTER — HOSPITAL ENCOUNTER (OUTPATIENT)
Dept: CARDIOLOGY | Facility: HOSPITAL | Age: 88
Discharge: HOME | End: 2023-03-22
Admitting: INTERNAL MEDICINE
Payer: COMMERCIAL

## 2023-03-22 VITALS — HEIGHT: 71 IN | WEIGHT: 214.95 LBS | BODY MASS INDEX: 30.09 KG/M2

## 2023-03-22 DIAGNOSIS — I50.23 ACUTE ON CHRONIC HFREF (HEART FAILURE WITH REDUCED EJECTION FRACTION): ICD-10-CM

## 2023-03-22 DIAGNOSIS — I51.9 SYSTOLIC DYSFUNCTION: ICD-10-CM

## 2023-03-22 LAB
BH CV ECHO MEAS - ACS: 2.29 CM
BH CV ECHO MEAS - AO ROOT DIAM: 3.6 CM
BH CV ECHO MEAS - EDV(CUBED): 215 ML
BH CV ECHO MEAS - EDV(MOD-SP4): 127 ML
BH CV ECHO MEAS - EF(MOD-SP4): 39.5 %
BH CV ECHO MEAS - ESV(CUBED): 99.7 ML
BH CV ECHO MEAS - ESV(MOD-SP4): 76.8 ML
BH CV ECHO MEAS - FS: 22.6 %
BH CV ECHO MEAS - IVS/LVPW: 0.98 CM
BH CV ECHO MEAS - IVSD: 1.09 CM
BH CV ECHO MEAS - LA DIMENSION: 5 CM
BH CV ECHO MEAS - LV DIASTOLIC VOL/BSA (35-75): 58.4 CM2
BH CV ECHO MEAS - LV MASS(C)D: 278.1 GRAMS
BH CV ECHO MEAS - LV SYSTOLIC VOL/BSA (12-30): 35.3 CM2
BH CV ECHO MEAS - LVIDD: 6 CM
BH CV ECHO MEAS - LVIDS: 4.6 CM
BH CV ECHO MEAS - LVPWD: 1.11 CM
BH CV ECHO MEAS - SI(MOD-SP4): 23.1 ML/M2
BH CV ECHO MEAS - SV(MOD-SP4): 50.2 ML
LV EF 2D ECHO EST: 46 %
MAXIMAL PREDICTED HEART RATE: 133 BPM
STRESS TARGET HR: 113 BPM

## 2023-03-22 PROCEDURE — 93308 TTE F-UP OR LMTD: CPT

## 2023-03-22 PROCEDURE — 93308 TTE F-UP OR LMTD: CPT | Performed by: INTERNAL MEDICINE

## 2023-03-23 ENCOUNTER — OFFICE VISIT (OUTPATIENT)
Dept: CARDIOLOGY | Facility: CLINIC | Age: 88
End: 2023-03-23
Payer: COMMERCIAL

## 2023-03-23 VITALS — HEART RATE: 62 BPM | BODY MASS INDEX: 30.02 KG/M2 | HEIGHT: 71 IN | WEIGHT: 214.4 LBS

## 2023-03-23 DIAGNOSIS — Z79.01 CURRENT USE OF LONG TERM ANTICOAGULATION: ICD-10-CM

## 2023-03-23 DIAGNOSIS — I51.9 SYSTOLIC DYSFUNCTION: ICD-10-CM

## 2023-03-23 DIAGNOSIS — I25.9 IHD (ISCHEMIC HEART DISEASE): Primary | ICD-10-CM

## 2023-03-23 DIAGNOSIS — I48.20 CHRONIC ATRIAL FIBRILLATION: ICD-10-CM

## 2023-03-23 PROCEDURE — 99214 OFFICE O/P EST MOD 30 MIN: CPT | Performed by: NURSE PRACTITIONER

## 2023-03-23 PROCEDURE — 93000 ELECTROCARDIOGRAM COMPLETE: CPT | Performed by: NURSE PRACTITIONER

## 2023-03-23 NOTE — PROGRESS NOTES
Chief Complaint   Patient presents with   • Follow-up     Cardiac management. Reports having more energy since starting increased dose of Entresto   • LABS     Had Echo yesterday at [facility]. He has asked about results of testing  Labs- No current labs   • Atrial Fibrillation     Patient is asymptomatic and does not know when/if in AFIB .    • Med Refill     No refills needed today.       Subjective       Balbir Gillespie is a 87 y.o. male  seen in August 2022 to establish cardiac care.  He has history of IHD.  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  He followed in [place] for a period of time. At consultation he admitted to chest pressure and nausea.  He had gone to emergency department for similar symptoms and EKG revealed atrial fibrillation.  Borderline elevated troponin was noted.  CT scan of abdomen showed evidence of diverticulosis.  Prilosec was added.  Repeat EKG showed persistent atrial fibrillation.  Xarelto was prescribed. On 9/27/2022 Lexiscan stress test showed moderate to large area of anterior and apical infarct.  EF was 20%.  Entresto was prescribed along with LifeVest.  Echocardiogram showed EF of 21-25% and mild to moderate TR with RVSP of 67 mmHg. On 10/4/2022 cardiac catheterization revealed significant disease of LAD.  2.75 x 8 mm resolute stent was placed.  He was continued on Entresto and beta-blockers along with a LifeVest.  On 10/18/2022 echocardiogram showed EF around 30% with moderate MR and RVSP around 46 mmHg.  He later developed a significant nosebleed and stopped taking anticoagulation therapy.  He was then referred to EP for further evaluation/recommendations regarding ICD placement and Watchman device.    In February 2023 he seen Dr. Barba for recommendations. Plan was to assess atrial fibrillation burden therefore cardiac monitor placed. The dose of Entresto was increased and plan to repeat echocardiogram to look for improvement of  "LVEF.  Results of Zio patch showed burden of atrial fibrillation at 100%.  NSVT noted. Repeat limited echocardiogram was done 3/22/2023 showing EF 31-35%.    Today he returns to the office for follow-up visit.  Patient denies feeling heart palpitations and only has awareness of irregular heart rate when assesses his pulse.  Since increase in Entresto he has been able to \"walk better\".  No chest pain noted.  He he denies recurrence of nosebleeds and states he is very cautious to not blow his nose.  However, he continues to have a lot of sinus drainage.      Cardiac History:    Past Surgical History:   Procedure Laterality Date   • CARDIOVASCULAR STRESS TEST  02/07/2011    @ [facility]. Lexiscan- EF 44%. Anteroapical Infarct   • CARDIOVASCULAR STRESS TEST  09/27/2022    Lexiscan-170/86. EF 20%. Large anterior and apical  Infarct   • CORONARY ANGIOPLASTY WITH STENT PLACEMENT  10/04/2022    [facility]- 2.75x8 mm Resolute stent mid LAD, EF 25%   • ECHO - CONVERTED  09/27/2022    EF < 25%. Anterior WMA. LA- 5.2. Trace-Mild MR & AI. RVSP- 67 mmHg   • ECHO - CONVERTED  10/08/2022    @ [facility]. EF 30%.Anterior WMA. Mod MR. RVSP- 46 mmHg   • ECHO - CONVERTED  01/04/2023    Dil LV. EF 30%. Anterior WMA. LA- 5.2   • ECHO - CONVERTED  03/22/2023    Limited Echo- EF 30-35%. Anteroseptal WMA. LA- 5.0       Current Outpatient Medications   Medication Sig Dispense Refill   • ALPRAZolam (XANAX) 0.5 MG tablet Take 1 tablet by mouth 2 (Two) Times a Day As Needed for Anxiety.     • apixaban (ELIQUIS) 2.5 MG tablet tablet Take 1 tablet by mouth 2 (Two) Times a Day. 180 tablet 2   • clopidogrel (PLAVIX) 75 MG tablet Take 1 tablet by mouth Daily.     • isosorbide mononitrate (IMDUR) 30 MG 24 hr tablet Take 1 tablet by mouth 2 (Two) Times a Day.     • metoprolol succinate XL (TOPROL-XL) 25 MG 24 hr tablet Take 1 tablet by mouth Daily. 30 tablet 8   • pantoprazole (PROTONIX) 40 MG EC tablet Take 1 tablet by mouth Daily.     • sacubitril-valsartan " (ENTRESTO) 49-51 MG tablet Take 1 tablet by mouth 2 (Two) Times a Day. 60 tablet 5   • simvastatin (ZOCOR) 20 MG tablet Take 1 tablet by mouth Every Night.       No current facility-administered medications for this visit.       Lisinopril    Past Medical History:   Diagnosis Date   • Anxiety    • Coronary artery disease    • History of abdominal surgery     to remove a benign tumor   • History of cholecystectomy    • History of hernia repair    • History of tonsillectomy    • Hyperlipidemia    • Hypertension    • Myocardial infarction (HCC)        Social History     Socioeconomic History   • Marital status:    Tobacco Use   • Smoking status: Former     Packs/day: 1.50     Years: 20.00     Pack years: 30.00     Types: Cigarettes     Quit date:      Years since quittin.2   • Smokeless tobacco: Never   Vaping Use   • Vaping Use: Never used   Substance and Sexual Activity   • Alcohol use: Never   • Drug use: Never       Family History   Problem Relation Age of Onset   • Other Mother         PPM   • Heart failure Mother    • Heart failure Father    • Heart attack Father    • Parkinsonism Brother    • Cancer Maternal Grandmother    • Alcohol abuse Maternal Grandfather    • Other Paternal Grandmother          from natural causes   • Other Paternal Grandfather    • No Known Problems Son    • No Known Problems Son        Review of Systems   Constitutional: Positive for malaise/fatigue. Negative for diaphoresis and fever.   HENT: Positive for nosebleeds (none in last few months).    Cardiovascular: Positive for irregular heartbeat. Negative for chest pain, leg swelling, near-syncope and palpitations.   Respiratory: Positive for shortness of breath (improved).    Hematologic/Lymphatic: Negative for bleeding problem. Bruises/bleeds easily.   Skin: Negative for color change.   Musculoskeletal: Positive for arthritis and joint pain. Negative for falls.   Gastrointestinal: Negative for abdominal pain,  "hematemesis, melena and nausea.   Genitourinary: Negative for dysuria and hematuria.   Neurological: Positive for tremors and weakness (improved).   Psychiatric/Behavioral: Negative for memory loss. The patient is nervous/anxious.         BP Readings from Last 5 Encounters:   02/22/23 148/82   11/15/22 140/70   08/16/22 150/70       Wt Readings from Last 5 Encounters:   03/23/23 97.3 kg (214 lb 6.4 oz)   03/22/23 97.5 kg (214 lb 15.2 oz)   02/22/23 97.5 kg (215 lb)   11/15/22 95 kg (209 lb 6.4 oz)   08/16/22 99.8 kg (220 lb)       Objective     Pulse 62   Ht 180 cm (70.87\")   Wt 97.3 kg (214 lb 6.4 oz)   BMI 30.01 kg/m²     Vitals and nursing note reviewed.   Constitutional:       Appearance: Healthy appearance. Not in distress.   Eyes:      Conjunctiva/sclera: Conjunctivae normal.      Pupils: Pupils are equal, round, and reactive to light.   HENT:      Head: Normocephalic.   Pulmonary:      Effort: Pulmonary effort is normal.      Breath sounds: Normal breath sounds.   Cardiovascular:      PMI at left midclavicular line. Normal rate. Irregular rhythm.   Pulses:     Intact distal pulses.   Edema:     Peripheral edema absent.   Abdominal:      General: Bowel sounds are normal.      Palpations: Abdomen is soft.   Musculoskeletal: Normal range of motion.      Cervical back: Normal range of motion and neck supple. Skin:     General: Skin is warm and dry.   Neurological:      Mental Status: Alert, oriented to person, place, and time and oriented to person, place and time.            ECG 12 Lead    Date/Time: 3/23/2023 4:29 PM  Performed by: Layla Grissom APRN  Authorized by: Layla Grissom APRN   Comparison: compared with previous ECG from 2/22/2023  Similar to previous ECG  Rhythm: atrial fibrillation                 Assessment & Plan   Diagnoses and all orders for this visit:    1. IHD (ischemic heart disease) (Primary)    2. Chronic atrial fibrillation (HCC)    3. Systolic dysfunction    4. Current use of " long term anticoagulation    Other orders  -     ECG 12 Lead      IHD  - 10/4/2022 cath with stent to mid LAD  - Continue Plavix  -Continue statin  - Chest pain denied.  Shortness of breath and effort tolerance improved    Chronic atrial fibrillation  -Recent 7-day cardiac monitor showed 100% atrial fibrillation burden  -EKG today shows atrial fibrillation with controlled rate.  Continue Toprol-XL 25 mg daily.  -Palpitations denied  - Patient denies recurrence of nosebleeds or other signs of bleeding.  Continue Eliquis at renal dose.    Systolic heart failure  -Recent echocardiogram showed EF slightly improved at 31-35%  - Following with EP regarding possible ICD placement  -Currently symptoms well managed  -Continue increased dose Entresto.  Samples given.  -Continue metoprolol succinate    Patient will be seen EP, Dr. Barba, in July for further evaluation and recommendations regarding ICD and Watchman device    A 6-month follow-up visit scheduled.

## 2023-04-05 RX ORDER — PANTOPRAZOLE SODIUM 40 MG/1
TABLET, DELAYED RELEASE ORAL
Qty: 30 TABLET | Refills: 5 | Status: SHIPPED | OUTPATIENT
Start: 2023-04-05

## 2023-05-25 ENCOUNTER — DOCUMENTATION (OUTPATIENT)
Dept: CARDIOLOGY | Facility: CLINIC | Age: 88
End: 2023-05-25
Payer: COMMERCIAL

## 2023-05-25 NOTE — PROGRESS NOTES
Talked with Mr. Gillespie today.  Overall on the higher dose of Entresto he does feel clinically improved.  His repeat echocardiogram demonstrated an increase in his LVEF to 31-35%.  The event recorder did demonstrate permanent atrial fibrillation with adequate rate control and an average heart rate of 65 bpm.  In addition, he has had no recurrent nosebleeds on the lower dose of Eliquis.  Long discussion with him today.  We did discuss the pros and cons of ICD implantation versus waiting an additional period of time to see if his LVEF gets better than 35%.  We also discussed the pros and cons of the Watchman device.  At this point he would like to increase his Entresto with MUGA examination of his LVEF in the last week of June.  Hold off on Watchman device at this time.  We will check BMP 5 days later after increasing his Entresto.

## 2023-05-25 NOTE — Clinical Note
Sharifa, the patient is to increase his Entresto which she is presently on 1 tablet of 47/51 mg p.o. twice daily to taking 2 pills p.o. twice a day.  I would like to have him have a BMP 5 days later (he would like to get it done at [place]) and fax back to me.  In addition I would like to schedule a MUGA examination with Dr. Mak in the last week of June.

## 2023-05-26 ENCOUNTER — TELEPHONE (OUTPATIENT)
Dept: CARDIOLOGY | Facility: CLINIC | Age: 88
End: 2023-05-26
Payer: COMMERCIAL

## 2023-05-26 DIAGNOSIS — I51.9 SYSTOLIC DYSFUNCTION: Primary | ICD-10-CM

## 2023-05-26 DIAGNOSIS — I25.5 ISCHEMIC CARDIOMYOPATHY: ICD-10-CM

## 2023-05-26 DIAGNOSIS — I48.20 CHRONIC ATRIAL FIBRILLATION: ICD-10-CM

## 2023-05-26 DIAGNOSIS — I48.0 AF (PAROXYSMAL ATRIAL FIBRILLATION): ICD-10-CM

## 2023-05-26 NOTE — TELEPHONE ENCOUNTER
----- Message from Yariel Barba MD sent at 5/25/2023  2:21 PM EDT -----  Sharifa, the patient is to increase his Entresto which she is presently on 1 tablet of 47/51 mg p.o. twice daily to taking 2 pills p.o. twice a day.  I would like to have him have a BMP 5 days later (he would like to get it done at [place]) and fax back to me.  In addition I would like to schedule a MUGA examination with Dr. Mak in the last week of June.

## 2023-07-19 ENCOUNTER — TELEPHONE (OUTPATIENT)
Dept: CARDIOLOGY | Facility: CLINIC | Age: 88
End: 2023-07-19

## 2023-07-19 NOTE — TELEPHONE ENCOUNTER
Caller: MERNA CHÁVEZ    Relationship: Emergency Contact    Best call back number: [phone]    What is the best time to reach you: ANYTIME    What was the call regarding: PATIENT SAW DR. TRAN IN [place] TODAY AND HE SAID THAT HE WANTS TO SEE THE PATIENT IN DECEMBER WHEN HE HAS AN APPOINTMENT AT DR. MATSON'S OFFICE. THEY WOULD ALSO LIKE FOR HIM TO HAVE AN ECHOCARDIOGRAM DONE BEFORE.

## 2023-07-21 NOTE — TELEPHONE ENCOUNTER
I spoke with Carlos and she is aware that when he comes in office for appointment   with Layla 10- she  will order Echo prior to his follow up appointment with

## 2023-10-17 ENCOUNTER — OFFICE VISIT (OUTPATIENT)
Dept: CARDIOLOGY | Facility: CLINIC | Age: 88
End: 2023-10-17
Payer: COMMERCIAL

## 2023-10-17 VITALS
DIASTOLIC BLOOD PRESSURE: 80 MMHG | WEIGHT: 211.2 LBS | HEIGHT: 71 IN | HEART RATE: 70 BPM | SYSTOLIC BLOOD PRESSURE: 126 MMHG | BODY MASS INDEX: 29.57 KG/M2

## 2023-10-17 DIAGNOSIS — I25.9 IHD (ISCHEMIC HEART DISEASE): Primary | ICD-10-CM

## 2023-10-17 DIAGNOSIS — Z79.01 CURRENT USE OF LONG TERM ANTICOAGULATION: ICD-10-CM

## 2023-10-17 DIAGNOSIS — I48.20 CHRONIC ATRIAL FIBRILLATION: ICD-10-CM

## 2023-10-17 DIAGNOSIS — I25.5 ISCHEMIC CARDIOMYOPATHY: ICD-10-CM

## 2023-10-17 DIAGNOSIS — Z79.899 MEDICATION MANAGEMENT: ICD-10-CM

## 2023-10-17 DIAGNOSIS — I51.9 SYSTOLIC DYSFUNCTION: ICD-10-CM

## 2023-10-17 DIAGNOSIS — K21.9 GASTROESOPHAGEAL REFLUX DISEASE, UNSPECIFIED WHETHER ESOPHAGITIS PRESENT: ICD-10-CM

## 2023-10-17 PROCEDURE — 93000 ELECTROCARDIOGRAM COMPLETE: CPT | Performed by: NURSE PRACTITIONER

## 2023-10-17 PROCEDURE — 99214 OFFICE O/P EST MOD 30 MIN: CPT | Performed by: NURSE PRACTITIONER

## 2023-10-17 RX ORDER — CHLORDIAZEPOXIDE HYDROCHLORIDE AND CLIDINIUM BROMIDE 5; 2.5 MG/1; MG/1
1 CAPSULE ORAL 2 TIMES DAILY WITH MEALS
COMMUNITY

## 2023-10-17 RX ORDER — ISOSORBIDE MONONITRATE 60 MG/1
TABLET, EXTENDED RELEASE ORAL
Qty: 90 TABLET | Refills: 3 | Status: SHIPPED | OUTPATIENT
Start: 2023-10-17

## 2023-10-17 RX ORDER — PANTOPRAZOLE SODIUM 40 MG/1
40 TABLET, DELAYED RELEASE ORAL DAILY
Qty: 90 TABLET | Refills: 3 | Status: SHIPPED | OUTPATIENT
Start: 2023-10-17

## 2023-10-17 RX ORDER — METOPROLOL SUCCINATE 25 MG/1
25 TABLET, EXTENDED RELEASE ORAL DAILY
Qty: 90 TABLET | Refills: 3 | Status: SHIPPED | OUTPATIENT
Start: 2023-10-17

## 2023-10-17 NOTE — PROGRESS NOTES
Chief Complaint   Patient presents with    Follow-up     Cardiac management    Atrial Fibrillation     Patient says he has no symptoms of AFIB    LABS     Results  May 2023 results on chart    Med Refill     Needs refills on  Eliquis 30 day supply and Metoprolol, Protonix and Imdur 90 day supply to [facility]       Subjective       Balbir Gillespie is a 88 y.o. male seen in August 2022 to establish cardiac care.  He has history of IHD.  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  He followed in [place] for a period of time. At consultation he admitted to chest pressure and nausea.  He had gone to emergency department for similar symptoms and EKG revealed atrial fibrillation.  Borderline elevated troponin was noted.  CT scan of abdomen showed evidence of diverticulosis.  Prilosec was added.  Repeat EKG showed persistent atrial fibrillation.  Xarelto was prescribed. On 9/27/2022 Lexiscan stress test showed moderate to large area of anterior and apical infarct.  EF was 20%.  Entresto was prescribed along with LifeVest.  Echocardiogram showed EF of 21-25% and mild to moderate TR with RVSP of 67 mmHg. On 10/4/2022 cardiac catheterization revealed significant disease of LAD.  2.75 x 8 mm resolute stent was placed.  He was continued on Entresto and beta-blockers along with a LifeVest.  On 10/18/2022 echocardiogram showed EF around 30% with moderate MR and RVSP around 46 mmHg.  He later developed a significant nosebleed and stopped taking anticoagulation therapy.  He was then referred to EP for further evaluation/recommendations regarding ICD placement and Watchman device.     In February 2023 he seen Dr. Barba for recommendations.  To assess atrial fibrillation burden Zio patch placed with results showing 100%, NSVT noted.  Entresto was increased.  Echocardiogram done 3/22/2023 showed EF around 31-35%.  MUGA scan showed 16%.    Patient seen EP in July  2023 with decision to not have ICD. Bleeding was denied, Eliquis continued with plan to stop Plavix 1 year post stenting. Therefore left atrial appendage declined.     Today he returns to the office for a follow up visit.  He has resumed bowling twice a week and maintaining his normal daily activities around home without cardiac concerns.  He denies increased shortness of breath, edema, or significant fatigue.  No chest pain noted.  He remains on Eliquis therapy at renal dose, bleeding denied except for one scant nosebleed that resolved with conservative measures.  He admits to bruising easily.  Blood pressure remained stable.      Cardiac History:    Past Surgical History:   Procedure Laterality Date    CARDIOVASCULAR STRESS TEST  02/07/2011    @ [facility]. Lexiscan- EF 44%. Anteroapical Infarct    CARDIOVASCULAR STRESS TEST  09/27/2022    Lexiscan-170/86. EF 20%. Large anterior and apical  Infarct    CORONARY ANGIOPLASTY WITH STENT PLACEMENT  10/04/2022    [facility]- 2.75x8 mm Resolute stent mid LAD, EF 25%    ECHO - CONVERTED  09/27/2022    EF < 25%. Anterior WMA. LA- 5.2. Trace-Mild MR & AI. RVSP- 67 mmHg    ECHO - CONVERTED  10/08/2022    @ [facility]. EF 30%.Anterior WMA. Mod MR. RVSP- 46 mmHg    ECHO - CONVERTED  01/04/2023    Dil LV. EF 30%. Anterior WMA. LA- 5.2    ECHO - CONVERTED  03/22/2023    Limited Echo- EF 30-35%. Anteroseptal WMA. LA- 5.0    OTHER SURGICAL HISTORY  06/23/2023    MUGA- LVEF 16%. RVSF 55%       Current Outpatient Medications   Medication Sig Dispense Refill    ALPRAZolam (XANAX) 0.5 MG tablet Take 1 tablet by mouth 2 (Two) Times a Day As Needed for Anxiety.      apixaban (ELIQUIS) 2.5 MG tablet tablet Take 1 tablet by mouth 2 (Two) Times a Day. 30 tablet 11    clidinium-chlordiazePOXIDE (LIBRAX) 5-2.5 MG per capsule Take 1 capsule by mouth 2 (Two) Times a Day With Meals.      metoprolol succinate XL (TOPROL-XL) 25 MG 24 hr tablet Take 1 tablet by mouth Daily. 90 tablet 3    pantoprazole (PROTONIX)  40 MG EC tablet Take 1 tablet by mouth Daily. 90 tablet 3    sacubitril-valsartan (ENTRESTO)  MG tablet Take 1 tablet by mouth 2 (Two) Times a Day. 60 tablet 6    simvastatin (ZOCOR) 20 MG tablet Take 1 tablet by mouth Every Night.      isosorbide mononitrate (IMDUR) 60 MG 24 hr tablet Take 1/2 tablet twice a day 90 tablet 3     No current facility-administered medications for this visit.       Lisinopril    Past Medical History:   Diagnosis Date    Anxiety     Coronary artery disease     History of abdominal surgery     to remove a benign tumor    History of cholecystectomy     History of hernia repair     History of tonsillectomy     Hyperlipidemia     Hypertension     Myocardial infarction        Social History     Socioeconomic History    Marital status:    Tobacco Use    Smoking status: Former     Packs/day: 1.50     Years: 20.00     Additional pack years: 0.00     Total pack years: 30.00     Types: Cigarettes     Quit date:      Years since quittin.8    Smokeless tobacco: Never   Vaping Use    Vaping Use: Never used   Substance and Sexual Activity    Alcohol use: Yes     Comment: 2 glass yearly    Drug use: Never       Family History   Problem Relation Age of Onset    Other Mother         PPM    Heart failure Mother     Heart failure Father     Heart attack Father     Parkinsonism Brother     Cancer Maternal Grandmother     Alcohol abuse Maternal Grandfather     Other Paternal Grandmother          from natural causes    Other Paternal Grandfather     No Known Problems Son     No Known Problems Son        Review of Systems   Constitutional: Positive for malaise/fatigue (Relates to age, no worse). Negative for diaphoresis and fever.   HENT:  Positive for congestion (Mild nasal, no worse). Negative for hoarse voice and nosebleeds.    Cardiovascular:  Positive for irregular heartbeat. Negative for chest pain, leg swelling, near-syncope and palpitations.   Respiratory:  Negative for  "shortness of breath and sleep disturbances due to breathing.    Hematologic/Lymphatic: Negative for bleeding problem. Bruises/bleeds easily.   Musculoskeletal:  Positive for back pain. Negative for falls.   Gastrointestinal:  Negative for change in bowel habit, melena and nausea.   Genitourinary:  Negative for hematuria.   Neurological:  Negative for dizziness and loss of balance.        BP Readings from Last 5 Encounters:   10/17/23 126/80   07/19/23 136/78   02/22/23 148/82   11/15/22 140/70   08/16/22 150/70       Wt Readings from Last 5 Encounters:   10/17/23 95.8 kg (211 lb 3.2 oz)   07/19/23 96.6 kg (213 lb)   03/23/23 97.3 kg (214 lb 6.4 oz)   03/22/23 97.5 kg (214 lb 15.2 oz)   02/22/23 97.5 kg (215 lb)       Objective     /80 (BP Location: Left arm, Patient Position: Sitting)   Pulse 70   Ht 180 cm (70.87\")   Wt 95.8 kg (211 lb 3.2 oz)   BMI 29.56 kg/m²     Vitals and nursing note reviewed.   Constitutional:       Appearance: Normal appearance. Well-groomed and not in distress.   Eyes:      Conjunctiva/sclera: Conjunctivae normal.      Pupils: Pupils are equal, round, and reactive to light.   HENT:      Head: Normocephalic.   Pulmonary:      Effort: Pulmonary effort is normal.      Breath sounds: Normal breath sounds.   Cardiovascular:      PMI at left midclavicular line. Normal rate. Irregular rhythm.   Pulses:     Intact distal pulses.   Edema:     Peripheral edema absent.   Abdominal:      General: Bowel sounds are normal.      Palpations: Abdomen is soft.   Musculoskeletal: Normal range of motion.      Cervical back: Normal range of motion and neck supple. Skin:     General: Skin is warm and dry.   Neurological:      Mental Status: Alert, oriented to person, place, and time and oriented to person, place and time.   Psychiatric:         Behavior: Behavior is cooperative.            ECG 12 Lead    Date/Time: 10/19/2023 2:26 PM  Performed by: Layla Grissom APRN    Authorized by: Layla Grissom " R, APRN  Comparison: compared with previous ECG from 3/23/2023  Similar to previous ECG  Rhythm: atrial fibrillation  Rate: normal  BPM: 70               Assessment & Plan   Diagnoses and all orders for this visit:    1. IHD (ischemic heart disease) (Primary)  -     isosorbide mononitrate (IMDUR) 60 MG 24 hr tablet; Take 1/2 tablet twice a day  Dispense: 90 tablet; Refill: 3    2. Ischemic cardiomyopathy  -     Adult Transthoracic Echo Complete W/ Cont if Necessary Per Protocol; Future    3. Systolic dysfunction  -     Adult Transthoracic Echo Complete W/ Cont if Necessary Per Protocol; Future  -     metoprolol succinate XL (TOPROL-XL) 25 MG 24 hr tablet; Take 1 tablet by mouth Daily.  Dispense: 90 tablet; Refill: 3    4. Chronic atrial fibrillation  -     apixaban (ELIQUIS) 2.5 MG tablet tablet; Take 1 tablet by mouth 2 (Two) Times a Day.  Dispense: 30 tablet; Refill: 11    5. Gastroesophageal reflux disease, unspecified whether esophagitis present  -     pantoprazole (PROTONIX) 40 MG EC tablet; Take 1 tablet by mouth Daily.  Dispense: 90 tablet; Refill: 3    Other orders  -     ECG 12 Lead        IHD  -Denies chest pain  - 10/4/2022 cath with stent to mid LAD  - Given 1 year post stenting Plavix will be discontinued.  -Continue statin     Chronic atrial fibrillation  -7-day cardiac monitor showed 100% atrial fibrillation burden  -EKG today shows atrial fibrillation with controlled rate.    -Continue Toprol-XL 25 mg daily.  -Palpitations denied  - Patient denies bleeding.  Continue Eliquis at renal dose.  -Watchman device declined given he is now tolerating anticoagulation therapy     Systolic heart failure  -Recent echocardiogram showed EF 31-35%, MUGA scan LVEF 16%  - Following with EP.  -Currently symptoms well managed, appears euvolemic  -Continue increased dose Entresto.    -Continue metoprolol succinate  -Echocardiogram ordered to be done in December prior to follow-up visit with EP.    6-month follow-up  visit scheduled.

## 2023-10-19 PROCEDURE — 93000 ELECTROCARDIOGRAM COMPLETE: CPT | Performed by: NURSE PRACTITIONER

## 2023-11-07 ENCOUNTER — TELEPHONE (OUTPATIENT)
Dept: CARDIOLOGY | Facility: CLINIC | Age: 88
End: 2023-11-07
Payer: COMMERCIAL

## 2023-11-07 NOTE — TELEPHONE ENCOUNTER
Caller: Balbir Gillespie    Relationship: Self    Best call back number:[phone]    What is the best time to reach you: ANY    Who are you requesting to speak with (clinical staff, provider,  specific staff member): DR TRAN      What was the call regarding: PATIENT CALLED TO INQUIRE ABOUT HIS RESULTS REGARDING HIS 11-20-23 SCHEDULED ECHO. PATIENT WOULD LIKE DR TRAN TO CONTACT HIM TO ADVISE THE RESULTS AND IF HE WOULD LIKE TO SEE HIM AS WELL.     Is it okay if the provider responds through [employer]: PLEASE CALL

## 2023-11-08 ENCOUNTER — TELEPHONE (OUTPATIENT)
Dept: CARDIOLOGY | Facility: CLINIC | Age: 88
End: 2023-11-08
Payer: COMMERCIAL

## 2023-11-08 NOTE — TELEPHONE ENCOUNTER
I called to speak with the patient. The patient did not answer. I left a voicemail for a return call.

## 2023-11-08 NOTE — TELEPHONE ENCOUNTER
I called and spoke with the patient. He states he is having an echocardiogram on 11-20-23. He states he lives in [place] and would prefer not to drive to [place] to receive the result from Dr. Barba. He would like our office to reach out to him to go over the results once we receive them.     I communicated to the patient that we would reach out once results are received. Patient voiced understanding.

## 2023-11-08 NOTE — TELEPHONE ENCOUNTER
Caller: Balbir Gillespie    Relationship: Self    Best call back number: [phone]     What is the best time to reach you: ANY     Who are you requesting to speak with (clinical staff, provider,  specific staff member): CLINICAL    Do you know the name of the person who called: MICHAEL     What was the call regarding: PT WAS RETURNING MICHAEL'S CALL. PLEASE REACH OUT TO FURTHER ADVISE. THANK YOU!

## 2023-11-20 ENCOUNTER — HOSPITAL ENCOUNTER (OUTPATIENT)
Dept: CARDIOLOGY | Facility: HOSPITAL | Age: 88
Discharge: HOME | End: 2023-11-20
Admitting: NURSE PRACTITIONER
Payer: COMMERCIAL

## 2023-11-20 VITALS — BODY MASS INDEX: 29.57 KG/M2 | HEIGHT: 71 IN | WEIGHT: 211.2 LBS

## 2023-11-20 DIAGNOSIS — I25.5 ISCHEMIC CARDIOMYOPATHY: ICD-10-CM

## 2023-11-20 DIAGNOSIS — I51.9 SYSTOLIC DYSFUNCTION: ICD-10-CM

## 2023-11-20 LAB
AORTIC DIMENSIONLESS INDEX: 0.94 (DI)
BH CV ECHO MEAS - AO MAX PG: 2.04 MMHG
BH CV ECHO MEAS - AO MEAN PG: 1.24 MMHG
BH CV ECHO MEAS - AO V2 MAX: 71.5 CM/SEC
BH CV ECHO MEAS - AO V2 VTI: 15.2 CM
BH CV ECHO MEAS - EDV(CUBED): 175.7 ML
BH CV ECHO MEAS - EF(MOD-BP): 36 %
BH CV ECHO MEAS - EF_3D-VOL: 45 %
BH CV ECHO MEAS - ESV(CUBED): 101.2 ML
BH CV ECHO MEAS - FS: 16.8 %
BH CV ECHO MEAS - IVS/LVPW: 0.94 CM
BH CV ECHO MEAS - IVSD: 1.14 CM
BH CV ECHO MEAS - LA DIMENSION: 4.6 CM
BH CV ECHO MEAS - LAT PEAK E' VEL: 6.4 CM/SEC
BH CV ECHO MEAS - LV MASS(C)D: 273.2 GRAMS
BH CV ECHO MEAS - LV MAX PG: 1.82 MMHG
BH CV ECHO MEAS - LV MEAN PG: 0.9 MMHG
BH CV ECHO MEAS - LV V1 MAX: 67.4 CM/SEC
BH CV ECHO MEAS - LV V1 VTI: 14.8 CM
BH CV ECHO MEAS - LVIDD: 5.6 CM
BH CV ECHO MEAS - LVIDS: 4.7 CM
BH CV ECHO MEAS - LVPWD: 1.21 CM
BH CV ECHO MEAS - MED PEAK E' VEL: 4.3 CM/SEC
BH CV ECHO MEAS - MV A MAX VEL: 20.4 CM/SEC
BH CV ECHO MEAS - MV DEC TIME: 0.27 SEC
BH CV ECHO MEAS - MV E MAX VEL: 52.1 CM/SEC
BH CV ECHO MEAS - MV E/A: 2.6
BH CV ECHO MEAS - MV MAX PG: 2.1 MMHG
BH CV ECHO MEAS - MV MEAN PG: 0.66 MMHG
BH CV ECHO MEAS - MV V2 VTI: 27.4 CM
BH CV ECHO MEAS - PA V2 MAX: 72.5 CM/SEC
BH CV ECHO MEAS - RAP SYSTOLE: 8 MMHG
BH CV ECHO MEAS - RV MAX PG: 1.47 MMHG
BH CV ECHO MEAS - RV V1 MAX: 60.7 CM/SEC
BH CV ECHO MEAS - RV V1 VTI: 12.2 CM
BH CV ECHO MEAS - RVDD: 3.6 CM
BH CV ECHO MEAS - RVSP: 42 MMHG
BH CV ECHO MEAS - TR MAX PG: 34 MMHG
BH CV ECHO MEAS - TR MAX VEL: 291.7 CM/SEC
BH CV ECHO MEASUREMENTS AVERAGE E/E' RATIO: 9.74
SINUS: 3.4 CM

## 2023-11-20 PROCEDURE — 93306 TTE W/DOPPLER COMPLETE: CPT

## 2023-11-22 ENCOUNTER — TELEPHONE (OUTPATIENT)
Dept: CARDIOLOGY | Facility: CLINIC | Age: 88
End: 2023-11-22

## 2024-03-05 RX ORDER — SACUBITRIL AND VALSARTAN 97; 103 MG/1; MG/1
1 TABLET, FILM COATED ORAL 2 TIMES DAILY
Qty: 60 TABLET | Refills: 0 | Status: SHIPPED | OUTPATIENT
Start: 2024-03-05

## 2024-03-18 ENCOUNTER — TELEPHONE (OUTPATIENT)
Dept: CARDIOLOGY | Facility: CLINIC | Age: 89
End: 2024-03-18
Payer: COMMERCIAL

## 2024-03-18 NOTE — TELEPHONE ENCOUNTER
Patient left VM, he was evaluated at [facility] ER 3/15/24 for chest pain and advised to call here for follow up.  ER note and EKG printed for review.

## 2024-03-20 NOTE — TELEPHONE ENCOUNTER
I spoke with patient and he feels the episode was possibly due to indigestion.  He reports feeling fine now  and has been bowling since this episodes. He is reminded of his follow up appointment 4- and is aware to call us if needed.

## 2024-04-04 RX ORDER — SACUBITRIL AND VALSARTAN 97; 103 MG/1; MG/1
1 TABLET, FILM COATED ORAL 2 TIMES DAILY
Qty: 60 TABLET | Refills: 0 | Status: SHIPPED | OUTPATIENT
Start: 2024-04-04

## 2024-04-04 NOTE — TELEPHONE ENCOUNTER
Susanne-please contact patient for a f/u appt. He saw Dr. Barba 7/19/2023 and was supposed to f/u in 6 months. I will send in his entresto but he will need an appointment for additional refills. Thanks.

## 2024-04-24 ENCOUNTER — OFFICE VISIT (OUTPATIENT)
Dept: CARDIOLOGY | Facility: CLINIC | Age: 89
End: 2024-04-24
Payer: COMMERCIAL

## 2024-04-24 VITALS
HEIGHT: 71 IN | WEIGHT: 214.8 LBS | SYSTOLIC BLOOD PRESSURE: 126 MMHG | BODY MASS INDEX: 30.07 KG/M2 | HEART RATE: 68 BPM | DIASTOLIC BLOOD PRESSURE: 62 MMHG

## 2024-04-24 DIAGNOSIS — I25.9 IHD (ISCHEMIC HEART DISEASE): Primary | ICD-10-CM

## 2024-04-24 DIAGNOSIS — Z79.01 CURRENT USE OF LONG TERM ANTICOAGULATION: ICD-10-CM

## 2024-04-24 DIAGNOSIS — I25.5 ISCHEMIC CARDIOMYOPATHY: ICD-10-CM

## 2024-04-24 DIAGNOSIS — I48.20 CHRONIC ATRIAL FIBRILLATION: ICD-10-CM

## 2024-04-24 DIAGNOSIS — I51.9 SYSTOLIC DYSFUNCTION: ICD-10-CM

## 2024-04-24 RX ORDER — SACUBITRIL AND VALSARTAN 97; 103 MG/1; MG/1
1 TABLET, FILM COATED ORAL 2 TIMES DAILY
Qty: 60 TABLET | Refills: 8 | Status: SHIPPED | OUTPATIENT
Start: 2024-04-24

## 2024-04-24 RX ORDER — ISOSORBIDE MONONITRATE 30 MG/1
30 TABLET, EXTENDED RELEASE ORAL DAILY
COMMUNITY

## 2024-04-24 NOTE — PROGRESS NOTES
Chief Complaint   Patient presents with    Follow-up     Cardiac management. Patient had ER visit 3-2024 , notes are in chart    Atrial Fibrillation     Patient reports  he had a couple separate episode of chest pressure  with use of Nitro with relief. He adds that both episodes were soon after eating.  See telephone encounter March 2024    LABS     Result March 2024 on chart    Med Refill     Needs refills on entresto 30 day supply to [facility] .       Subjective       Balbir Gillespie is a 89 y.o. male seen in August 2022 to establish cardiac care.  He has history of IHD.  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  He followed in [place] for a period of time. At consultation he admitted to chest pressure and nausea.  He had gone to emergency department for similar symptoms and EKG revealed atrial fibrillation.  Borderline elevated troponin was noted.  CT scan of abdomen showed evidence of diverticulosis.  Prilosec was added.  Repeat EKG showed persistent atrial fibrillation.  Xarelto was prescribed. On 9/27/2022 Lexiscan stress test showed moderate to large area of anterior and apical infarct.  EF was 20%.  Entresto was prescribed along with LifeVest.  Echocardiogram showed EF of 21-25% and mild to moderate TR with RVSP of 67 mmHg. On 10/4/2022 cardiac catheterization revealed significant disease of LAD.  2.75 x 8 mm resolute stent was placed.  He was continued on Entresto and beta-blockers along with a LifeVest.  On 10/18/2022 echocardiogram showed EF around 30% with moderate MR and RVSP around 46 mmHg.  He later developed a significant nosebleed and stopped taking anticoagulation therapy.  He was then referred to EP for further evaluation/recommendations regarding ICD placement and Watchman device.     In February 2023 he seen Dr. Barba for recommendations.  To assess atrial fibrillation burden Zio patch placed with results showing 100%  A-fib burden.  Entresto was increased.  Echocardiogram done 3/22/2023 showed EF around 31-35%.  MUGA scan showed 16%.     Patient seen EP in July 2023 with decision to not have ICD. Bleeding was denied, Eliquis continued with plan to stop Plavix 1 year post stenting. Therefore left atrial appendage declined.     On 11/20/2023 echocardiogram showed mild to moderately dilated left ventricle cavity, mild LVH with a EF around 30%, mild to moderate TR and RVSP 42 mmHg.    Today he returns to the office for follow-up visit.  At last visit Plavix was stopped and Eliquis therapy continue due to atrial fibrillation.  He denies recent nosebleeds or other signs of bleeding.  He continues to bowl several times a week without cardiac concerns.  Blood pressure remained stable.  He is on lower dose Imdur, otherwise no medication changes noted.  Since he does not plan to have left atrial appendage closure device he request to cancel follow-up with EP.    Cardiac History:    Past Surgical History:   Procedure Laterality Date    CARDIOVASCULAR STRESS TEST  02/07/2011    @ [facility]. Lexiscan- EF 44%. Anteroapical Infarct    CARDIOVASCULAR STRESS TEST  09/27/2022    Lexiscan-170/86. EF 20%. Large anterior and apical  Infarct    CORONARY ANGIOPLASTY WITH STENT PLACEMENT  10/04/2022    [facility]- 2.75x8 mm Resolute stent mid LAD, EF 25%    ECHO - CONVERTED  09/27/2022    EF < 25%. Anterior WMA. LA- 5.2. Trace-Mild MR & AI. RVSP- 67 mmHg    ECHO - CONVERTED  10/08/2022    @ [facility]. EF 30%.Anterior WMA. Mod MR. RVSP- 46 mmHg    ECHO - CONVERTED  01/04/2023    Dil LV. EF 30%. Anterior WMA. LA- 5.2    ECHO - CONVERTED  03/22/2023    Limited Echo- EF 30-35%. Anteroseptal WMA. LA- 5.0    ECHO - CONVERTED  11/20/2023    EF 30%.Apical WMA. LA- 4.6. Mild MR. RVSP- 42 mmHg    OTHER SURGICAL HISTORY  06/23/2023    MUGA- LVEF 16%. RVSF 55%       Current Outpatient Medications   Medication Sig Dispense Refill    ALPRAZolam (XANAX) 0.5 MG tablet Take 1 tablet  by mouth 2 (Two) Times a Day As Needed for Anxiety.      apixaban (ELIQUIS) 2.5 MG tablet tablet Take 1 tablet by mouth 2 (Two) Times a Day. 30 tablet 11    clidinium-chlordiazePOXIDE (LIBRAX) 5-2.5 MG per capsule Take 1 capsule by mouth Daily.      isosorbide mononitrate (IMDUR) 30 MG 24 hr tablet Take 1 tablet by mouth Daily. 1/2 tablet twice daily      metoprolol succinate XL (TOPROL-XL) 25 MG 24 hr tablet Take 1 tablet by mouth Daily. 90 tablet 3    pantoprazole (PROTONIX) 40 MG EC tablet Take 1 tablet by mouth Daily. 90 tablet 3    sacubitril-valsartan (Entresto)  MG tablet Take 1 tablet by mouth 2 (Two) Times a Day. 60 tablet 8    simvastatin (ZOCOR) 20 MG tablet Take 1 tablet by mouth Every Night.       No current facility-administered medications for this visit.       Lisinopril    Past Medical History:   Diagnosis Date    Anxiety     Coronary artery disease     History of abdominal surgery     to remove a benign tumor    History of cholecystectomy     History of hernia repair     History of tonsillectomy     Hyperlipidemia     Hypertension     Myocardial infarction        Social History     Socioeconomic History    Marital status:    Tobacco Use    Smoking status: Former     Current packs/day: 0.00     Average packs/day: 1.5 packs/day for 20.0 years (30.0 ttl pk-yrs)     Types: Cigarettes     Start date:      Quit date:      Years since quittin.3    Smokeless tobacco: Never   Vaping Use    Vaping status: Never Used   Substance and Sexual Activity    Alcohol use: Yes     Comment: 2 glass yearly    Drug use: Never       Family History   Problem Relation Age of Onset    Other Mother         PPM    Heart failure Mother     Heart failure Father     Heart attack Father     Parkinsonism Brother     Cancer Maternal Grandmother     Alcohol abuse Maternal Grandfather     Other Paternal Grandmother          from natural causes    Other Paternal Grandfather     No Known Problems Son     No  "Known Problems Son        Review of Systems   Constitutional: Positive for malaise/fatigue (no worse). Negative for diaphoresis.   HENT:  Negative for nosebleeds.    Cardiovascular:  Positive for irregular heartbeat. Negative for chest pain, leg swelling, near-syncope and palpitations.   Respiratory:  Negative for shortness of breath.    Hematologic/Lymphatic: Negative for bleeding problem. Does not bruise/bleed easily.   Skin:  Negative for color change.   Musculoskeletal:  Negative for falls.   Neurological:  Negative for loss of balance and weakness.        Admits feeling stronger, balance has improved        BP Readings from Last 5 Encounters:   04/24/24 126/62   10/17/23 126/80   07/19/23 136/78   02/22/23 148/82   11/15/22 140/70       Wt Readings from Last 5 Encounters:   04/24/24 97.4 kg (214 lb 12.8 oz)   11/20/23 95.8 kg (211 lb 3.2 oz)   10/17/23 95.8 kg (211 lb 3.2 oz)   07/19/23 96.6 kg (213 lb)   03/23/23 97.3 kg (214 lb 6.4 oz)       Objective     /62 (BP Location: Left arm, Patient Position: Sitting)   Pulse 68   Ht 180 cm (70.87\")   Wt 97.4 kg (214 lb 12.8 oz)   BMI 30.07 kg/m²     Vitals and nursing note reviewed.   Constitutional:       Appearance: Healthy appearance. Not in distress.   Eyes:      Conjunctiva/sclera: Conjunctivae normal.      Pupils: Pupils are equal, round, and reactive to light.   HENT:      Head: Normocephalic.   Neck:      Vascular: No carotid bruit or JVD.   Pulmonary:      Effort: Pulmonary effort is normal.      Breath sounds: Normal breath sounds.   Cardiovascular:      PMI at left midclavicular line. Normal rate. Irregular rhythm.   Edema:     Peripheral edema absent.   Abdominal:      General: Bowel sounds are normal.      Palpations: Abdomen is soft.   Musculoskeletal: Normal range of motion.      Cervical back: Normal range of motion and neck supple. Skin:     General: Skin is warm and dry.   Neurological:      Mental Status: Alert, oriented to person, place, " and time and oriented to person, place and time.            ECG 12 Lead    Date/Time: 4/24/2024 1:32 PM  Performed by: Layla Grissom APRN    Authorized by: Layla Grissom APRN  Comparison: compared with previous ECG from 10/19/2023  Similar to previous ECG  Rhythm: atrial fibrillation  Rate: normal  BPM: 68               Assessment & Plan   Diagnoses and all orders for this visit:    1. IHD (ischemic heart disease) (Primary)  -     sacubitril-valsartan (Entresto)  MG tablet; Take 1 tablet by mouth 2 (Two) Times a Day.  Dispense: 60 tablet; Refill: 8  -     ECG 12 Lead    2. Ischemic cardiomyopathy    3. Systolic dysfunction  -     sacubitril-valsartan (Entresto)  MG tablet; Take 1 tablet by mouth 2 (Two) Times a Day.  Dispense: 60 tablet; Refill: 8    4. Chronic atrial fibrillation    5. Current use of long term anticoagulation      IHD  -Denies chest pain  - 10/4/2022 cath with stent to mid LAD  -Continue statin, not on aspirin due to Eliquis therapy     Chronic atrial fibrillation  -EKG: Atrial fibrillation with controlled rate.    -Continue Toprol-XL 25 mg daily.  -Palpitations denied  - Patient denies bleeding.  Continue Eliquis at renal dose.  - Patient continues to decline left atrial appendage closure device as he is tolerating current anticoagulation therapy.    -Due to travel and transportation issues he request to cancel appointment in [place] with the EP.     Systolic heart failure  - December 2023: Echocardiogram EF around 62%  -Currently symptoms well managed, appears euvolemic  -Continue Entresto.    -Continue metoprolol succinate    Labs per PCP.  Please forward copy of results as available       6-month follow-up visit scheduled.               Electronically signed by MAGALIS Moss,  April 24, 2024 13:33 EDT    Dictated Utilizing Dragon Dictation: Part of this note may be an electronic transcription/translation of spoken language to printed text using the Dragon Dictation  System.

## 2024-04-26 ENCOUNTER — TELEPHONE (OUTPATIENT)
Dept: CARDIOLOGY | Facility: CLINIC | Age: 89
End: 2024-04-26
Payer: COMMERCIAL

## 2024-04-26 NOTE — TELEPHONE ENCOUNTER
Patient has requested his appointment with Dr Barba 5- be cancelled due to difficulty with transportation to [place].  If any questions or concerns please call us at [phone] . Thank you

## 2024-06-03 DIAGNOSIS — I48.20 CHRONIC ATRIAL FIBRILLATION: ICD-10-CM

## 2024-06-10 DIAGNOSIS — I48.20 CHRONIC ATRIAL FIBRILLATION: ICD-10-CM

## 2024-06-10 NOTE — TELEPHONE ENCOUNTER
Caller: Balbir Gillespie    Relationship: Self    Best call back number: [phone]    Requested Prescriptions:   Requested Prescriptions     Pending Prescriptions Disp Refills    apixaban (ELIQUIS) 2.5 MG tablet tablet 30 tablet 11     Sig: Take 1 tablet by mouth 2 (Two) Times a Day.        Pharmacy where request should be sent: [facility] [address] - [phone]  - [phone]      Last office visit with prescribing clinician: 4/24/2024   Last telemedicine visit with prescribing clinician: Visit date not found   Next office visit with prescribing clinician: 11/6/2024     Additional details provided by patient: PATIENT ADVISED HIS PCP HAD TO PRESCRIBE FOR 30 DAYS BECAUSE THE OFFICE SENT THE REFILL REQUEST BACK TO THE PHARMACY PER THE PATIENT.     Does the patient have less than a 3 day supply:  [] Yes  [x] No    Would you like a call back once the refill request has been completed: [] Yes [x] No    If the office needs to give you a call back, can they leave a voicemail: [] Yes [x] No    Sandra Posada Rep   06/10/24 10:20 EDT

## 2024-06-24 ENCOUNTER — TELEPHONE (OUTPATIENT)
Dept: CARDIOLOGY | Facility: CLINIC | Age: 89
End: 2024-06-24
Payer: COMMERCIAL

## 2024-06-24 DIAGNOSIS — I48.20 CHRONIC ATRIAL FIBRILLATION: ICD-10-CM

## 2024-06-24 RX ORDER — APIXABAN 2.5 MG/1
2.5 TABLET, FILM COATED ORAL 2 TIMES DAILY
Qty: 30 TABLET | Refills: 11 | OUTPATIENT
Start: 2024-06-24

## 2024-06-24 NOTE — TELEPHONE ENCOUNTER
Caller: Balbir Gillespie    Relationship: Self    Best call back number: [phone]    What is the best time to reach you: ANY    What was the call regarding: PATIENT ADVISING THE ELIQUIS PRESCRIPTION WAS WRITTEN INCORRECTLY. CARINA WROTE IT FOR 12 MONTHS BUT IT CONTRADICTS IT STATES TO TAKE 2 DAILY CAUSING THE PRESCRIPTION TO RUN OUT IN 6 MONTHS. HIS PCP WROTE A 30 DAY PRESCRIPTION BUT THIS NEEDS TO BE ADDRESSED, HE WILL RUN OUT BEFORE THE NEXT SCHEDULED APPOINTMENT. PLEASE ADVISE THE PATIENT OF THE UPDATE.     Is it okay if the provider responds through MyChart: NO

## 2024-08-15 ENCOUNTER — TELEPHONE (OUTPATIENT)
Dept: CARDIOLOGY | Facility: CLINIC | Age: 89
End: 2024-08-15
Payer: COMMERCIAL

## 2024-08-15 NOTE — TELEPHONE ENCOUNTER
Caller: Balbir Gillespie    Relationship: Self    Best call back number: [phone]    What is the best time to reach you: ANYTIME    Who are you requesting to speak with (clinical staff, provider,  specific staff member): NITA BLANCAS    What was the call regarding: PT IS ASKING FOR MEDICAL ADVICE- IF HE SHOULD GET A WATCHMAN OR NOT.PT SAID THIS HAS BEEN DISCUSSED BEFORE. PLEASE CALL PT WHEN AVAILABLE.

## 2024-08-19 NOTE — TELEPHONE ENCOUNTER
I spoke with patient  and he  would like to discuss options of Watchmen . His reasoning is he is having frequent back pain and  cannot take NSAIDS for pain while he is taking Eliquis . He would like your opinion on this

## 2024-08-21 NOTE — TELEPHONE ENCOUNTER
I have reviewed EP's note from last visit.  He had preferred to not do Watchman device especially once Plavix discontinued and not having current signs of bleeding.  I am unsure if he would be a candidate purely based on medication.  I will try to call patient during lunch break tomorrow.

## 2024-08-28 ENCOUNTER — TELEPHONE (OUTPATIENT)
Dept: CARDIOLOGY | Facility: CLINIC | Age: 89
End: 2024-08-28
Payer: COMMERCIAL

## 2024-11-06 ENCOUNTER — OFFICE VISIT (OUTPATIENT)
Dept: CARDIOLOGY | Facility: CLINIC | Age: 89
End: 2024-11-06
Payer: COMMERCIAL

## 2024-11-06 VITALS
HEART RATE: 70 BPM | DIASTOLIC BLOOD PRESSURE: 70 MMHG | SYSTOLIC BLOOD PRESSURE: 132 MMHG | WEIGHT: 213 LBS | BODY MASS INDEX: 29.82 KG/M2 | HEIGHT: 71 IN

## 2024-11-06 DIAGNOSIS — K21.9 GASTROESOPHAGEAL REFLUX DISEASE, UNSPECIFIED WHETHER ESOPHAGITIS PRESENT: ICD-10-CM

## 2024-11-06 DIAGNOSIS — I25.9 IHD (ISCHEMIC HEART DISEASE): ICD-10-CM

## 2024-11-06 DIAGNOSIS — I48.20 CHRONIC ATRIAL FIBRILLATION: Primary | ICD-10-CM

## 2024-11-06 DIAGNOSIS — I51.9 SYSTOLIC DYSFUNCTION: ICD-10-CM

## 2024-11-06 DIAGNOSIS — Z79.01 CURRENT USE OF LONG TERM ANTICOAGULATION: ICD-10-CM

## 2024-11-06 DIAGNOSIS — E78.00 HYPERCHOLESTEROLEMIA: ICD-10-CM

## 2024-11-06 PROBLEM — I48.0 AF (PAROXYSMAL ATRIAL FIBRILLATION): Status: RESOLVED | Noted: 2023-02-22 | Resolved: 2024-11-06

## 2024-11-06 RX ORDER — METOPROLOL SUCCINATE 25 MG/1
25 TABLET, EXTENDED RELEASE ORAL DAILY
Qty: 90 TABLET | Refills: 3 | Status: SHIPPED | OUTPATIENT
Start: 2024-11-06

## 2024-11-06 RX ORDER — PANTOPRAZOLE SODIUM 40 MG/1
40 TABLET, DELAYED RELEASE ORAL DAILY
Qty: 90 TABLET | Refills: 3 | Status: SHIPPED | OUTPATIENT
Start: 2024-11-06

## 2024-11-06 RX ORDER — SACUBITRIL AND VALSARTAN 97; 103 MG/1; MG/1
1 TABLET, FILM COATED ORAL 2 TIMES DAILY
Qty: 60 TABLET | Refills: 8 | Status: SHIPPED | OUTPATIENT
Start: 2024-11-06

## 2024-11-06 NOTE — PROGRESS NOTES
Chief Complaint   Patient presents with    Follow-up     Cardiac management    Atrial Fibrillation     Patient reports he does not feel any symptoms of AFIB    LABS     Had labs in the September 2024 at [facility] . Patient reports all in normal range    Med Refill     Needs refills on metoprolol and Protonix 90  day supply and entresto 30 day supply  to [facility]       Subjective       Balbir Gillespie is a 89 y.o. male seen in August 2022 to establish cardiac care.  He has history of IHD.  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  He followed in [place] for a period of time. At consultation he admitted to chest pressure and nausea.  He had gone to emergency department for similar symptoms and EKG revealed atrial fibrillation.  Borderline elevated troponin was noted.  CT scan of abdomen showed evidence of diverticulosis.  Prilosec was added.  Repeat EKG showed persistent atrial fibrillation.  Xarelto was prescribed. On 9/27/2022 Lexiscan stress test showed moderate to large area of anterior and apical infarct.  EF was 20%.  Entresto was prescribed along with LifeVest.  Echocardiogram showed EF of 21-25% and mild to moderate TR with RVSP of 67 mmHg. On 10/4/2022 cardiac catheterization revealed significant disease of LAD.  2.75 x 8 mm resolute stent was placed.  He was continued on Entresto and beta-blockers along with a LifeVest.  On 10/18/2022 echocardiogram showed EF around 30% with moderate MR and RVSP around 46 mmHg.  He later developed a significant nosebleed and stopped taking anticoagulation therapy.  He was then referred to EP for further evaluation/recommendations regarding ICD placement and Watchman device.     In February 2023 he seen Dr. Barba for recommendations.  To assess atrial fibrillation burden Zio patch placed with results showing 100% A-fib burden.  Entresto was increased.  Echocardiogram done 3/22/2023 showed EF around 31-35%.   MUGA scan showed 16%.     Patient seen EP in July 2023 with decision to not have ICD. Bleeding was denied, Eliquis continued with plan to stop Plavix 1 year post stenting. Therefore left atrial appendage declined.      On 11/20/2023 echocardiogram showed mild to moderately dilated left ventricle cavity, mild LVH with a EF around 30%, mild to moderate TR and RVSP 42 mmHg.    Today he returns to the office for a follow-up visit.  He denies recent signs of bleeding specifically denies recent nosebleeds.  Heart rate remains normal.  He is unaware of heart palpitations or feeling atrial fibrillation.  No recent chest pain or dizziness noted.  Ambulation is limited due to significant back pain.  However, he continues to bowling for recreation and playing the piano.    Cardiac History:    Past Surgical History:   Procedure Laterality Date    CARDIOVASCULAR STRESS TEST  02/07/2011    @ [facility]. Lexiscan- EF 44%. Anteroapical Infarct    CARDIOVASCULAR STRESS TEST  09/27/2022    Lexiscan-170/86. EF 20%. Large anterior and apical  Infarct    CORONARY ANGIOPLASTY WITH STENT PLACEMENT  10/04/2022    [facility]- 2.75x8 mm Resolute stent mid LAD, EF 25%    ECHO - CONVERTED  09/27/2022    EF < 25%. Anterior WMA. LA- 5.2. Trace-Mild MR & AI. RVSP- 67 mmHg    ECHO - CONVERTED  10/08/2022    @ [facility]. EF 30%.Anterior WMA. Mod MR. RVSP- 46 mmHg    ECHO - CONVERTED  01/04/2023    Dil LV. EF 30%. Anterior WMA. LA- 5.2    ECHO - CONVERTED  03/22/2023    Limited Echo- EF 30-35%. Anteroseptal WMA. LA- 5.0    ECHO - CONVERTED  11/20/2023    EF 30%.Apical WMA. LA- 4.6. Mild MR. RVSP- 42 mmHg    OTHER SURGICAL HISTORY  06/23/2023    MUGA- LVEF 16%. RVSF 55%       Current Outpatient Medications   Medication Sig Dispense Refill    apixaban (ELIQUIS) 2.5 MG tablet tablet Take 1 tablet by mouth 2 (Two) Times a Day. 60 tablet 11    clidinium-chlordiazePOXIDE (LIBRAX) 5-2.5 MG per capsule Take 1 capsule by mouth Daily.      isosorbide mononitrate (IMDUR)  30 MG 24 hr tablet Take 1 tablet by mouth Daily. 1/2 tablet twice daily      metoprolol succinate XL (TOPROL-XL) 25 MG 24 hr tablet Take 1 tablet by mouth Daily. 90 tablet 3    pantoprazole (PROTONIX) 40 MG EC tablet Take 1 tablet by mouth Daily. 90 tablet 3    sacubitril-valsartan (Entresto)  MG tablet Take 1 tablet by mouth 2 (Two) Times a Day. 60 tablet 8    simvastatin (ZOCOR) 20 MG tablet Take 1 tablet by mouth Every Night.      ALPRAZolam (XANAX) 0.5 MG tablet Take 1 tablet by mouth Every Night.       No current facility-administered medications for this visit.       Lisinopril    Past Medical History:   Diagnosis Date    Anxiety     Coronary artery disease     History of abdominal surgery     to remove a benign tumor    History of cholecystectomy     History of hernia repair     History of tonsillectomy     Hyperlipidemia     Hypertension     Myocardial infarction        Social History     Socioeconomic History    Marital status:    Tobacco Use    Smoking status: Former     Current packs/day: 0.00     Average packs/day: 1.5 packs/day for 20.0 years (30.0 ttl pk-yrs)     Types: Cigarettes     Start date:      Quit date:      Years since quittin.8    Smokeless tobacco: Never   Vaping Use    Vaping status: Never Used   Substance and Sexual Activity    Alcohol use: Yes     Comment: 2 glass yearly    Drug use: Never       Family History   Problem Relation Age of Onset    Other Mother         PPM    Heart failure Mother     Heart failure Father     Heart attack Father     Parkinsonism Brother     Cancer Maternal Grandmother     Alcohol abuse Maternal Grandfather     Other Paternal Grandmother          from natural causes    Other Paternal Grandfather     No Known Problems Son     No Known Problems Son        Review of Systems   Constitutional: Positive for malaise/fatigue (Relates to age). Negative for diaphoresis.   Cardiovascular:  Positive for irregular heartbeat. Negative for chest  "pain, leg swelling, near-syncope and palpitations.   Respiratory:  Negative for shortness of breath and sleep disturbances due to breathing.    Hematologic/Lymphatic: Negative for bleeding problem. Does not bruise/bleed easily.   Skin:  Negative for color change.   Musculoskeletal:  Positive for back pain. Negative for falls.   Psychiatric/Behavioral:  Positive for depression. Negative for suicidal ideas. The patient is nervous/anxious (Relates to family discord).         BP Readings from Last 5 Encounters:   11/06/24 132/70   04/24/24 126/62   10/17/23 126/80   07/19/23 136/78   02/22/23 148/82       Wt Readings from Last 5 Encounters:   11/06/24 96.6 kg (213 lb)   04/24/24 97.4 kg (214 lb 12.8 oz)   11/20/23 95.8 kg (211 lb 3.2 oz)   10/17/23 95.8 kg (211 lb 3.2 oz)   07/19/23 96.6 kg (213 lb)       Objective     /70 (BP Location: Left arm, Patient Position: Sitting, Cuff Size: Adult)   Pulse 70   Ht 180 cm (70.87\")   Wt 96.6 kg (213 lb)   BMI 29.82 kg/m²     Vitals and nursing note reviewed.   Constitutional:       Appearance: Not in distress.   Eyes:      Conjunctiva/sclera: Conjunctivae normal.      Pupils: Pupils are equal, round, and reactive to light.   HENT:      Head: Normocephalic.   Pulmonary:      Effort: Pulmonary effort is normal.      Breath sounds: Normal breath sounds.   Cardiovascular:      PMI at left midclavicular line. Normal rate. Irregular rhythm.   Edema:     Peripheral edema absent.   Abdominal:      General: Bowel sounds are normal.      Palpations: Abdomen is soft.   Musculoskeletal:      Cervical back: Neck supple. Skin:     General: Skin is warm and dry.   Neurological:      Mental Status: Alert, oriented to person, place, and time and oriented to person, place and time.   Psychiatric:         Attention and Perception: Attention normal.         Mood and Affect: Mood normal.         Speech: Speech normal.         Behavior: Behavior is cooperative.            ECG 12 " Lead    Date/Time: 11/6/2024 1:14 PM  Performed by: Layla Grissom APRN    Authorized by: Layla Grissom APRN  Comparison: compared with previous ECG from 4/24/2024  Rhythm: atrial fibrillation  Rate: normal  BPM: 70    Clinical impression: abnormal EKG               Assessment & Plan   Diagnoses and all orders for this visit:    1. Chronic atrial fibrillation (Primary)  -     ECG 12 Lead    2. Current use of long term anticoagulation    3. IHD (ischemic heart disease)  -     sacubitril-valsartan (Entresto)  MG tablet; Take 1 tablet by mouth 2 (Two) Times a Day.  Dispense: 60 tablet; Refill: 8  -     ECG 12 Lead    4. Systolic dysfunction  -     sacubitril-valsartan (Entresto)  MG tablet; Take 1 tablet by mouth 2 (Two) Times a Day.  Dispense: 60 tablet; Refill: 8  -     metoprolol succinate XL (TOPROL-XL) 25 MG 24 hr tablet; Take 1 tablet by mouth Daily.  Dispense: 90 tablet; Refill: 3    5. Gastroesophageal reflux disease, unspecified whether esophagitis present  -     pantoprazole (PROTONIX) 40 MG EC tablet; Take 1 tablet by mouth Daily.  Dispense: 90 tablet; Refill: 3    6. Hypercholesterolemia      IHD  -Denies chest pain  - 10/4/2022 cath with stent to mid LAD  -Continue statin, not on aspirin due to Eliquis therapy     Chronic atrial fibrillation  -EKG: Atrial fibrillation with controlled rate.    -Continue Toprol-XL 25 mg daily.  -Palpitations denied  - Continue Eliquis at renal dose.  - Patient continues to decline left atrial appendage closure device as he has not had recurrence of bleeding.     Systolic heart failure  - December 2023: Echocardiogram EF around 62%  -Currently symptoms well managed, appears euvolemic, heart rate normal  -Continue Entresto, Toprol XL    GERD  -Continue PPI  -Continue diet management    Hypercholesterolemia  -On simvastatin  -Labs from PCP      6-month follow-up visit scheduled.                 Electronically signed by MAGALIS Moss,  November 6, 2024  13:17 EST    Dictated Utilizing Dragon Dictation: Part of this note may be an electronic transcription/translation of spoken language to printed text using the Dragon Dictation System.

## 2025-05-12 ENCOUNTER — OFFICE VISIT (OUTPATIENT)
Dept: CARDIOLOGY | Facility: CLINIC | Age: OVER 89
End: 2025-05-12
Payer: COMMERCIAL

## 2025-05-12 VITALS
DIASTOLIC BLOOD PRESSURE: 60 MMHG | HEART RATE: 61 BPM | BODY MASS INDEX: 29.34 KG/M2 | HEIGHT: 71 IN | WEIGHT: 209.6 LBS | SYSTOLIC BLOOD PRESSURE: 114 MMHG

## 2025-05-12 DIAGNOSIS — I25.9 IHD (ISCHEMIC HEART DISEASE): Primary | ICD-10-CM

## 2025-05-12 DIAGNOSIS — Z79.01 CURRENT USE OF LONG TERM ANTICOAGULATION: ICD-10-CM

## 2025-05-12 DIAGNOSIS — E78.00 HYPERCHOLESTEROLEMIA: ICD-10-CM

## 2025-05-12 DIAGNOSIS — K21.9 GASTROESOPHAGEAL REFLUX DISEASE, UNSPECIFIED WHETHER ESOPHAGITIS PRESENT: ICD-10-CM

## 2025-05-12 DIAGNOSIS — I48.20 CHRONIC ATRIAL FIBRILLATION: ICD-10-CM

## 2025-05-12 DIAGNOSIS — I51.9 SYSTOLIC DYSFUNCTION: ICD-10-CM

## 2025-05-12 PROCEDURE — 99214 OFFICE O/P EST MOD 30 MIN: CPT | Performed by: NURSE PRACTITIONER

## 2025-05-12 PROCEDURE — 93000 ELECTROCARDIOGRAM COMPLETE: CPT | Performed by: NURSE PRACTITIONER

## 2025-05-12 RX ORDER — SACUBITRIL AND VALSARTAN 97; 103 MG/1; MG/1
1 TABLET, FILM COATED ORAL 2 TIMES DAILY
Qty: 180 TABLET | Refills: 2 | Status: SHIPPED | OUTPATIENT
Start: 2025-05-12

## 2025-05-12 NOTE — PROGRESS NOTES
Chief Complaint   Patient presents with    Follow-up     Cardiac management. Patient had Bronchitis and pleurisy since last office visit .    LABS     Will follow up with PCP  in July 2025  for labs    Atrial Fibrillation     Patient reports he does not feel palpitations but he  feels fatigued at times.    Med Refill     Needs refills on Eliquis and entresto 90 day supply to [facility]       Subjective       Balbir Gillespie is a 89 y.o. male seen in August 2022 to establish cardiac care.  He has history of IHD.  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  He followed in [place] for a period of time. At consultation he admitted to chest pressure and nausea.  He had gone to emergency department for similar symptoms and EKG revealed atrial fibrillation.  Borderline elevated troponin was noted.  CT scan of abdomen showed evidence of diverticulosis.  Prilosec was added.  Repeat EKG showed persistent atrial fibrillation.  Xarelto was prescribed. On 9/27/2022 Lexiscan stress test showed moderate to large area of anterior and apical infarct.  EF was 20%.  Entresto was prescribed along with LifeVest.  Echocardiogram showed EF of 21-25% and mild to moderate TR with RVSP of 67 mmHg. On 10/4/2022 cardiac catheterization revealed significant disease of LAD.  2.75 x 8 mm resolute stent was placed.  He was continued on Entresto and beta-blockers along with a LifeVest.  On 10/18/2022 echocardiogram showed EF around 30% with moderate MR and RVSP around 46 mmHg.  He later developed a significant nosebleed and stopped taking anticoagulation therapy.  He was then referred to EP for further evaluation/recommendations regarding ICD placement and Watchman device.     In February 2023 he seen Dr. Barba for recommendations.  To assess atrial fibrillation burden Zio patch placed with results showing 100% A-fib burden.  Entresto was increased.  Echocardiogram done  3/22/2023 showed EF around 31-35%.  MUGA scan showed 16%.     Patient seen EP in July 2023 with decision to not have ICD. Bleeding was denied, Eliquis continued with plan to stop Plavix 1 year post stenting. Therefore left atrial appendage declined.      On 11/20/2023 echocardiogram showed mild to moderately dilated left ventricle cavity, mild LVH with a EF around 30%, mild to moderate TR and RVSP 42 mmHg.    Today he returns to the office for a follow up visit. He denies chest pain, shortness of breath or TIA symptoms. Recently he started bowling again for the season and did well other than back pain. He is asking if he can take a different pain medication as tylenol is not helping.     Cardiac History:    Past Surgical History:   Procedure Laterality Date    CARDIOVASCULAR STRESS TEST  02/07/2011    @ [facility]. Lexiscan- EF 44%. Anteroapical Infarct    CARDIOVASCULAR STRESS TEST  09/27/2022    Lexiscan-170/86. EF 20%. Large anterior and apical  Infarct    CORONARY ANGIOPLASTY WITH STENT PLACEMENT  10/04/2022    [facility]- 2.75x8 mm Resolute stent mid LAD, EF 25%    ECHO - CONVERTED  09/27/2022    EF < 25%. Anterior WMA. LA- 5.2. Trace-Mild MR & AI. RVSP- 67 mmHg    ECHO - CONVERTED  10/08/2022    @ [facility]. EF 30%.Anterior WMA. Mod MR. RVSP- 46 mmHg    ECHO - CONVERTED  01/04/2023    Dil LV. EF 30%. Anterior WMA. LA- 5.2    ECHO - CONVERTED  03/22/2023    Limited Echo- EF 30-35%. Anteroseptal WMA. LA- 5.0    ECHO - CONVERTED  11/20/2023    EF 30%.Apical WMA. LA- 4.6. Mild MR. RVSP- 42 mmHg    OTHER SURGICAL HISTORY  06/23/2023    MUGA- LVEF 16%. RVSF 55%       Current Outpatient Medications   Medication Sig Dispense Refill    ALPRAZolam (XANAX) 0.5 MG tablet Take 1 tablet by mouth Every Night.      apixaban (ELIQUIS) 2.5 MG tablet tablet Take 1 tablet by mouth 2 (Two) Times a Day. 180 tablet 2    clidinium-chlordiazePOXIDE (LIBRAX) 5-2.5 MG per capsule Take 1 capsule by mouth Daily.      metoprolol succinate XL (TOPROL-XL)  25 MG 24 hr tablet Take 1 tablet by mouth Daily. 90 tablet 3    pantoprazole (PROTONIX) 40 MG EC tablet Take 1 tablet by mouth Daily. 90 tablet 3    sacubitril-valsartan (Entresto)  MG tablet Take 1 tablet by mouth 2 (Two) Times a Day. 180 tablet 2    simvastatin (ZOCOR) 20 MG tablet Take 1 tablet by mouth Every Night.       No current facility-administered medications for this visit.       Lisinopril    Past Medical History:   Diagnosis Date    Anxiety     Coronary artery disease     History of abdominal surgery     to remove a benign tumor    History of cholecystectomy     History of hernia repair     History of tonsillectomy     Hyperlipidemia     Hypertension     Myocardial infarction        Social History     Socioeconomic History    Marital status:    Tobacco Use    Smoking status: Former     Current packs/day: 0.00     Average packs/day: 1.5 packs/day for 20.0 years (30.0 ttl pk-yrs)     Types: Cigarettes     Start date:      Quit date:      Years since quittin.3    Smokeless tobacco: Never   Vaping Use    Vaping status: Never Used   Substance and Sexual Activity    Alcohol use: Yes     Comment: 2 glass yearly    Drug use: Never       Family History   Problem Relation Age of Onset    Other Mother         PPM    Heart failure Mother     Heart failure Father     Heart attack Father     Parkinsonism Brother     Cancer Maternal Grandmother     Alcohol abuse Maternal Grandfather     Other Paternal Grandmother          from natural causes    Other Paternal Grandfather     No Known Problems Son     No Known Problems Son        Review of Systems   Constitutional: Negative for diaphoresis and fever.   Cardiovascular:  Positive for irregular heartbeat. Negative for chest pain, dyspnea on exertion, leg swelling, near-syncope and palpitations.   Respiratory:  Negative for shortness of breath and sleep disturbances due to breathing.    Hematologic/Lymphatic: Negative for bleeding problem.  "Does not bruise/bleed easily.   Musculoskeletal:  Positive for arthritis and back pain.   Psychiatric/Behavioral:  Positive for depression. Negative for memory loss and suicidal ideas.         He admits to depression in regards to not having a close relationship to his sons.        BP Readings from Last 5 Encounters:   05/12/25 114/60   11/06/24 132/70   04/24/24 126/62   10/17/23 126/80   07/19/23 136/78       Wt Readings from Last 5 Encounters:   05/12/25 95.1 kg (209 lb 9.6 oz)   11/06/24 96.6 kg (213 lb)   04/24/24 97.4 kg (214 lb 12.8 oz)   11/20/23 95.8 kg (211 lb 3.2 oz)   10/17/23 95.8 kg (211 lb 3.2 oz)       Objective     /60 (BP Location: Left arm, Patient Position: Sitting, Cuff Size: Adult)   Pulse 61   Ht 180 cm (70.87\")   Wt 95.1 kg (209 lb 9.6 oz)   BMI 29.34 kg/m²     Vitals and nursing note reviewed.   Constitutional:       Appearance: Well-groomed and not in distress.   HENT:      Head: Normocephalic.   Pulmonary:      Effort: Pulmonary effort is normal.      Breath sounds: Normal breath sounds. No wheezing. No rales.   Cardiovascular:      PMI at left midclavicular line. Normal rate. Irregular rhythm.   Pulses:     Intact distal pulses.   Edema:     Peripheral edema absent.   Abdominal:      General: Bowel sounds are normal.      Palpations: Abdomen is soft.   Musculoskeletal:      Cervical back: Neck supple. Skin:     General: Skin is warm and dry.   Neurological:      Mental Status: Alert, oriented to person, place, and time and oriented to person, place and time.   Psychiatric:         Mood and Affect: Mood normal.         Speech: Speech normal.         Behavior: Behavior is cooperative.         Thought Content: Thought content normal.            ECG 12 Lead    Date/Time: 5/12/2025 2:54 PM  Performed by: Layla Grissom APRN    Authorized by: Layla Grissom APRN  Comparison: compared with previous ECG from 11/2/2024  Similar to previous ECG  Rhythm: atrial fibrillation  Rate: " normal  BPM: 61               Assessment & Plan   Diagnoses and all orders for this visit:    1. IHD (ischemic heart disease) (Primary)  -     sacubitril-valsartan (Entresto)  MG tablet; Take 1 tablet by mouth 2 (Two) Times a Day.  Dispense: 180 tablet; Refill: 2    2. Chronic atrial fibrillation  -     apixaban (ELIQUIS) 2.5 MG tablet tablet; Take 1 tablet by mouth 2 (Two) Times a Day.  Dispense: 180 tablet; Refill: 2  -     ECG 12 Lead    3. Current use of long term anticoagulation    4. Systolic dysfunction  -     sacubitril-valsartan (Entresto)  MG tablet; Take 1 tablet by mouth 2 (Two) Times a Day.  Dispense: 180 tablet; Refill: 2  -     ECG 12 Lead    5. Gastroesophageal reflux disease, unspecified whether esophagitis present    6. Hypercholesterolemia              IHD  -Denies chest pain  - 10/4/2022 cath with stent to mid LAD  -Continue statin, not on aspirin due to Eliquis therapy     Chronic atrial fibrillation  -EKG: Atrial fibrillation with controlled rate.    -Continue Toprol-XL 25 mg daily.  -Palpitations denied  -continue Eliquis at renal dose, signs of bleeding denied.     Systolic heart failure  - December 2023: Echocardiogram EF 62%  -Symptoms remain well-managed, euvolemic  -Continue Entresto, Toprol XL     GERD  -Continue PPI  -Continue diet management     Hypercholesterolemia  -On simvastatin  -Labs from PCP    Of note patient admits to back pain and ask about medication management other than Tylenol.  At this time we will continue Tylenol and further discuss at follow-up visit PCP.  Not a candidate for NSAIDs due to being on Eliquis and history of bleeding.    6-month follow-up visit scheduled.             Electronically signed by MAGALIS Moss,  May 12, 2025 15:06 EDT    Dictated Utilizing Dragon Dictation: Part of this note may be an electronic transcription/translation of spoken language to printed text using the Dragon Dictation System.